# Patient Record
Sex: MALE | Race: BLACK OR AFRICAN AMERICAN | Employment: OTHER | URBAN - METROPOLITAN AREA
[De-identification: names, ages, dates, MRNs, and addresses within clinical notes are randomized per-mention and may not be internally consistent; named-entity substitution may affect disease eponyms.]

---

## 2018-02-01 ENCOUNTER — HOSPITAL ENCOUNTER (OUTPATIENT)
Dept: GENERAL RADIOLOGY | Age: 83
Discharge: HOME | End: 2018-02-01
Attending: NURSE PRACTITIONER
Payer: COMMERCIAL

## 2018-02-01 DIAGNOSIS — R10.13 EPIGASTRIC PAIN: ICD-10-CM

## 2018-02-01 DIAGNOSIS — R68.81 EARLY SATIETY: ICD-10-CM

## 2018-02-01 DIAGNOSIS — R10.12 LUQ ABDOMINAL PAIN: ICD-10-CM

## 2018-02-01 PROCEDURE — 74245 XR UGI/BA SWALLOW W SM BOWEL: CPT

## 2018-02-01 PROCEDURE — 74011000255 HC RX REV CODE- 255: Performed by: NURSE PRACTITIONER

## 2018-02-01 PROCEDURE — 74011000250 HC RX REV CODE- 250: Performed by: NURSE PRACTITIONER

## 2018-02-01 RX ADMIN — BARIUM SULFATE 355 ML: 0.6 SUSPENSION ORAL at 14:06

## 2018-02-01 RX ADMIN — ANTACID/ANTIFLATULENT 4 G: 380; 550; 10; 10 GRANULE, EFFERVESCENT ORAL at 14:07

## 2018-02-01 RX ADMIN — BARIUM SULFATE 135 ML: 980 POWDER, FOR SUSPENSION ORAL at 14:06

## 2018-02-01 RX ADMIN — BARIUM SULFATE 350 ML: 240 SUSPENSION ORAL at 14:20

## 2018-04-20 PROBLEM — K21.9 GASTROESOPHAGEAL REFLUX DISEASE WITHOUT ESOPHAGITIS: Status: ACTIVE | Noted: 2018-04-20

## 2019-02-28 ENCOUNTER — APPOINTMENT (OUTPATIENT)
Dept: GENERAL RADIOLOGY | Age: 84
End: 2019-02-28
Attending: EMERGENCY MEDICINE
Payer: COMMERCIAL

## 2019-02-28 ENCOUNTER — HOSPITAL ENCOUNTER (EMERGENCY)
Age: 84
Discharge: HOME | End: 2019-02-28
Attending: EMERGENCY MEDICINE
Payer: COMMERCIAL

## 2019-02-28 VITALS
BODY MASS INDEX: 23.49 KG/M2 | SYSTOLIC BLOOD PRESSURE: 164 MMHG | RESPIRATION RATE: 16 BRPM | TEMPERATURE: 100.1 F | OXYGEN SATURATION: 100 % | WEIGHT: 155 LBS | HEIGHT: 68 IN | HEART RATE: 58 BPM | DIASTOLIC BLOOD PRESSURE: 82 MMHG

## 2019-02-28 DIAGNOSIS — J18.9 PNEUMONIA OF LEFT LOWER LOBE DUE TO INFECTIOUS ORGANISM: Primary | ICD-10-CM

## 2019-02-28 LAB
ALBUMIN SERPL-MCNC: 2.8 G/DL (ref 3.2–4.6)
ALBUMIN/GLOB SERPL: 0.6 {RATIO} (ref 1.2–3.5)
ALP SERPL-CCNC: 102 U/L (ref 50–136)
ALT SERPL-CCNC: 23 U/L (ref 12–65)
ANION GAP SERPL CALC-SCNC: 7 MMOL/L (ref 7–16)
AST SERPL-CCNC: 26 U/L (ref 15–37)
BASOPHILS # BLD: 0 K/UL (ref 0–0.2)
BASOPHILS NFR BLD: 0 % (ref 0–2)
BILIRUB SERPL-MCNC: 0.3 MG/DL (ref 0.2–1.1)
BUN SERPL-MCNC: 35 MG/DL (ref 8–23)
CALCIUM SERPL-MCNC: 8.9 MG/DL (ref 8.3–10.4)
CHLORIDE SERPL-SCNC: 107 MMOL/L (ref 98–107)
CK SERPL-CCNC: 141 U/L (ref 21–215)
CO2 SERPL-SCNC: 27 MMOL/L (ref 21–32)
CREAT SERPL-MCNC: 2.11 MG/DL (ref 0.8–1.5)
DIFFERENTIAL METHOD BLD: ABNORMAL
EOSINOPHIL # BLD: 0.2 K/UL (ref 0–0.8)
EOSINOPHIL NFR BLD: 2 % (ref 0.5–7.8)
ERYTHROCYTE [DISTWIDTH] IN BLOOD BY AUTOMATED COUNT: 14.8 % (ref 11.9–14.6)
FLUAV AG NPH QL IA: NEGATIVE
FLUBV AG NPH QL IA: NEGATIVE
GLOBULIN SER CALC-MCNC: 4.5 G/DL (ref 2.3–3.5)
GLUCOSE SERPL-MCNC: 99 MG/DL (ref 65–100)
HCT VFR BLD AUTO: 34.7 % (ref 41.1–50.3)
HGB BLD-MCNC: 11.3 G/DL (ref 13.6–17.2)
IMM GRANULOCYTES # BLD AUTO: 0 K/UL (ref 0–0.5)
IMM GRANULOCYTES NFR BLD AUTO: 0 % (ref 0–5)
LIPASE SERPL-CCNC: 105 U/L (ref 73–393)
LYMPHOCYTES # BLD: 0.8 K/UL (ref 0.5–4.6)
LYMPHOCYTES NFR BLD: 8 % (ref 13–44)
MCH RBC QN AUTO: 27.2 PG (ref 26.1–32.9)
MCHC RBC AUTO-ENTMCNC: 32.6 G/DL (ref 31.4–35)
MCV RBC AUTO: 83.4 FL (ref 79.6–97.8)
MONOCYTES # BLD: 1.2 K/UL (ref 0.1–1.3)
MONOCYTES NFR BLD: 12 % (ref 4–12)
NEUTS SEG # BLD: 7.5 K/UL (ref 1.7–8.2)
NEUTS SEG NFR BLD: 77 % (ref 43–78)
NRBC # BLD: 0 K/UL (ref 0–0.2)
PLATELET # BLD AUTO: 358 K/UL (ref 150–450)
PMV BLD AUTO: 10.1 FL (ref 9.4–12.3)
POTASSIUM SERPL-SCNC: 3.4 MMOL/L (ref 3.5–5.1)
PROT SERPL-MCNC: 7.3 G/DL (ref 6.3–8.2)
RBC # BLD AUTO: 4.16 M/UL (ref 4.23–5.6)
SODIUM SERPL-SCNC: 141 MMOL/L (ref 136–145)
SPECIMEN SOURCE: NORMAL
WBC # BLD AUTO: 9.7 K/UL (ref 4.3–11.1)

## 2019-02-28 PROCEDURE — 74011250636 HC RX REV CODE- 250/636: Performed by: EMERGENCY MEDICINE

## 2019-02-28 PROCEDURE — 96365 THER/PROPH/DIAG IV INF INIT: CPT | Performed by: EMERGENCY MEDICINE

## 2019-02-28 PROCEDURE — 87804 INFLUENZA ASSAY W/OPTIC: CPT

## 2019-02-28 PROCEDURE — 74011250637 HC RX REV CODE- 250/637: Performed by: EMERGENCY MEDICINE

## 2019-02-28 PROCEDURE — 71045 X-RAY EXAM CHEST 1 VIEW: CPT

## 2019-02-28 PROCEDURE — 82550 ASSAY OF CK (CPK): CPT

## 2019-02-28 PROCEDURE — 83690 ASSAY OF LIPASE: CPT

## 2019-02-28 PROCEDURE — 80053 COMPREHEN METABOLIC PANEL: CPT

## 2019-02-28 PROCEDURE — 85025 COMPLETE CBC W/AUTO DIFF WBC: CPT

## 2019-02-28 PROCEDURE — 99284 EMERGENCY DEPT VISIT MOD MDM: CPT | Performed by: EMERGENCY MEDICINE

## 2019-02-28 PROCEDURE — 96375 TX/PRO/DX INJ NEW DRUG ADDON: CPT | Performed by: EMERGENCY MEDICINE

## 2019-02-28 RX ORDER — ACETAMINOPHEN 500 MG
1000 TABLET ORAL
Status: COMPLETED | OUTPATIENT
Start: 2019-02-28 | End: 2019-02-28

## 2019-02-28 RX ORDER — LEVOFLOXACIN 750 MG/1
750 TABLET ORAL DAILY
Qty: 9 TAB | Refills: 0 | Status: SHIPPED | OUTPATIENT
Start: 2019-02-28 | End: 2019-03-15

## 2019-02-28 RX ORDER — LEVOFLOXACIN 5 MG/ML
750 INJECTION, SOLUTION INTRAVENOUS
Status: COMPLETED | OUTPATIENT
Start: 2019-02-28 | End: 2019-02-28

## 2019-02-28 RX ORDER — PREDNISONE 20 MG/1
60 TABLET ORAL DAILY
Qty: 15 TAB | Refills: 0 | Status: SHIPPED | OUTPATIENT
Start: 2019-02-28 | End: 2019-03-05

## 2019-02-28 RX ORDER — TRAMADOL HYDROCHLORIDE 50 MG/1
50 TABLET ORAL
Status: COMPLETED | OUTPATIENT
Start: 2019-02-28 | End: 2019-02-28

## 2019-02-28 RX ADMIN — TRAMADOL HYDROCHLORIDE 50 MG: 50 TABLET, FILM COATED ORAL at 15:38

## 2019-02-28 RX ADMIN — METHYLPREDNISOLONE SODIUM SUCCINATE 125 MG: 125 INJECTION, POWDER, FOR SOLUTION INTRAMUSCULAR; INTRAVENOUS at 16:11

## 2019-02-28 RX ADMIN — ACETAMINOPHEN 1000 MG: 500 TABLET ORAL at 14:12

## 2019-02-28 RX ADMIN — LEVOFLOXACIN 750 MG: 5 INJECTION, SOLUTION INTRAVENOUS at 16:11

## 2019-02-28 RX ADMIN — SODIUM CHLORIDE 1000 ML: 900 INJECTION, SOLUTION INTRAVENOUS at 14:12

## 2019-02-28 NOTE — DISCHARGE INSTRUCTIONS
Because chest x-ray findings can sometimes lag with regards to clinical symptoms I'm going to empirically treat U for pneumonia. A fever, productive cough, and pleuritic chest pain on the left side. Complete the full course of steroids and antibiotics. Please return if her symptoms change or worsen.

## 2019-02-28 NOTE — ED PROVIDER NOTES
75-year-old male presenting for diffuse muscle aches and pains as well as some nausea. States his symptoms started several days ago, progressively worsening. He has not really tried anything for him. He set up a follow-up appointment with his PCP for the symptoms \"became too bad and he could not wait\". He describes muscle spasms in his neck, middle of his back down into his low back. He's had no cough or chest pain. He does report that he had a low-grade fever. Shortness of breath or sore throat. The history is provided by the patient. Abdominal Pain This is a new problem. The current episode started 2 days ago. The problem occurs constantly. The problem has not changed since onset. The pain is associated with an unknown factor. The quality of the pain is aching. The pain is at a severity of 2/10. The pain is mild. Associated symptoms include a fever, headaches, arthralgias, myalgias and back pain. Pertinent negatives include no anorexia, no belching, no diarrhea, no flatus, no hematochezia, no melena, no nausea, no vomiting, no constipation, no dysuria, no frequency, no hematuria, no trauma, no chest pain and no testicular pain. The pain is worsened by activity. The pain is relieved by nothing. His past medical history does not include PUD, gallstones, GERD, ulcerative colitis, Crohn's disease, irritable bowel syndrome, cancer, UTI, pancreatitis, ectopic pregnancy, ovarian cysts, diverticulitis, atrial fibrillation, DM, kidney stones or small bowel obstruction. The patient's surgical history non-contributory. Past Medical History:  
Diagnosis Date  Asthma 2007  
 last exacerbation 5 yrs ago; very well controlled.  BPH (benign prostatic hypertrophy)  Cancer ([facility] [address] stomach(2004);  prostate cancer (2008)  Chronic kidney disease 2004  
 followed by [facility] (Dr. Sana Jalloh)  [facility] 7/28/2011  GERD (gastroesophageal reflux disease)   
 controlled w/pepcid  Gout  Hemorrhoids, internal   
 History of diverticulosis  Hypercholesteremia  Hypertension   
 controlled w/med. Last echo (9/14/10):EF 55-60%, normal LVSF. Stress test (9/14/10): EF 56%,mild inferoseptal scar.  Muscle spasm  OA (osteoarthritis)  REZA (obstructive sleep apnea) 7/28/2011  
 no cpap  Osteoporosis  Personal history of colonic polyps  Prediabetes  Wears dentures Past Surgical History:  
Procedure Laterality Date St BIOPSY PROSTATE  2008  
 treated w/injections every 6months by Dr. Geraldo Hsu  HX GASTRECTOMY  2004 RESECTION FOR CANCER  
 HX KNEE REPLACEMENT Bilateral   
 HX PROSTATECTOMY  2002 SUPRAPUBIC  
 SINUS SURGERY PROC UNLISTED  2005 MUCORMYCOSIS Family History:  
Problem Relation Age of Onset  Hypertension Mother  No Known Problems Father   
     pt does not know him  Hypertension Sister Social History Socioeconomic History  Marital status: LEGALLY  Spouse name: Not on file  Number of children: Not on file  Years of education: Not on file  Highest education level: Not on file Social Needs  Financial resource strain: Not on file  Food insecurity - worry: Not on file  Food insecurity - inability: Not on file  Transportation needs - medical: Not on file  Transportation needs - non-medical: Not on file Occupational History  Not on file Tobacco Use  Smoking status: Never Smoker  Smokeless tobacco: Never Used  Tobacco comment: cigars Substance and Sexual Activity  Alcohol use: No  
 Drug use: No  
 Sexual activity: Not Currently Other Topics Concern  Not on file Social History Narrative  Not on file ALLERGIES: Patient has no known allergies. Review of Systems Constitutional: Positive for fever. Cardiovascular: Negative for chest pain. Gastrointestinal: Positive for abdominal pain. Negative for anorexia, constipation, diarrhea, flatus, hematochezia, melena, nausea and vomiting. Genitourinary: Negative for dysuria, frequency, hematuria and testicular pain. Musculoskeletal: Positive for arthralgias, back pain and myalgias. Neurological: Positive for headaches. All other systems reviewed and are negative. Vitals:  
 02/28/19 1312 BP: 167/80 Pulse: 65 Resp: 16 Temp: (!) 100.9 °F (38.3 °C) SpO2: 100% Weight: 70.3 kg (155 lb) Height: 5' 8\" (1.727 m) Physical Exam  
Constitutional: He is oriented to person, place, and time. He appears well-developed and well-nourished. HENT:  
Head: Normocephalic and atraumatic. Eyes: Conjunctivae and EOM are normal. Pupils are equal, round, and reactive to light. Neck: Normal range of motion. Neck supple. Cardiovascular: Normal rate, regular rhythm, normal heart sounds and intact distal pulses. Pulmonary/Chest: Effort normal and breath sounds normal.  
Abdominal: Soft. Bowel sounds are normal.  
Musculoskeletal: Normal range of motion. He exhibits no deformity. Tenderness to palpation along the paraspinous muscles of the cervical, thoracic, and lumbar spine, no midline tenderness Neurological: He is alert and oriented to person, place, and time. No cranial nerve deficit. Skin: Skin is warm and dry. Psychiatric: He has a normal mood and affect. His behavior is normal.  
Nursing note and vitals reviewed. MDM Number of Diagnoses or Management Options Pneumonia of left lower lobe due to infectious organism [facility]):  
Diagnosis management comments: 51-year-old male with a fever and diffuse muscle aches. This is typical influenza type illness. Vital signs are reassuring. We will get basic labs with a CK, chest x-ray and influenza screen Amount and/or Complexity of Data Reviewed Clinical lab tests: ordered and reviewed Tests in the radiology section of CPT®: ordered and reviewed Tests in the medicine section of CPT®: ordered Decide to obtain previous medical records or to obtain history from someone other than the patient: yes Independent visualization of images, tracings, or specimens: yes (Personally reviewed the patient's chest x-ray) Risk of Complications, Morbidity, and/or Mortality Presenting problems: moderate Diagnostic procedures: moderate Management options: moderate General comments: I personally reviewed the patient's vital signs, laboratory tests, and/or radiological findings. I discussed these findings with the patient and there significance. I answered all questions and gave the patient clear return precautions. The patient was discharged from the emergency department in stable condition Patient Progress Patient progress: improved ED Course as of Feb 28 1614 Thu Feb 28, 2019  
1519 I reviewed the patient's CBC which is unremarkable, his creatinine is at his baseline, his potassium is borderline low but not very concerning. Lipase is normal, CK is normal, his influenza screen is negative. At this point, it is unclear exactly what is causing the patient's symptoms. His abdomen is benign he has no urinary symptoms. [JS] 1536 Patient reports now that he's had ongoing joint issues and he has been trying to get in with his primary doctor to get a steroid injection. The patient Solu-Medrol, tramadol and reevaluate. [JS] [id number] After talking further with the patient underwent a cover him empirically with antibiotics and steroids. [JS] ED Course User Index [JS] Tanisha Conteh MD  
 
 
Procedures

## 2019-02-28 NOTE — ED NOTES
I have reviewed discharge instructions with the patient. The patient verbalized understanding. Patient left ED via Discharge Method: wheelchair to waiting room to await transportation. Opportunity for questions and clarification provided. Patient given 2 scripts. No e-sign To continue your aftercare when you leave the hospital, you may receive an automated call from our care team to check in on how you are doing. This is a free service and part of our promise to provide the best care and service to meet your aftercare needs.  If you have questions, or wish to unsubscribe from this service please call [phone]. Thank you for Choosing our [facility] Emergency Department.

## 2019-02-28 NOTE — ED NOTES
Lab contacted, spoke with Allison Bucio, aware bloodwork sent with chart labels as clinical connect will not load pt stickers.

## 2019-02-28 NOTE — ED TRIAGE NOTES
Pt arrives via [facility] from home, pt states generalized body aches and abdominal pain, R bundle with EMS. VSS.

## 2019-03-23 ENCOUNTER — HOSPITAL ENCOUNTER (EMERGENCY)
Age: 84
Discharge: HOME | End: 2019-03-23
Attending: EMERGENCY MEDICINE
Payer: COMMERCIAL

## 2019-03-23 ENCOUNTER — APPOINTMENT (OUTPATIENT)
Dept: GENERAL RADIOLOGY | Age: 84
End: 2019-03-23
Attending: NURSE PRACTITIONER
Payer: COMMERCIAL

## 2019-03-23 VITALS
RESPIRATION RATE: 18 BRPM | HEIGHT: 68 IN | HEART RATE: 95 BPM | WEIGHT: 155 LBS | DIASTOLIC BLOOD PRESSURE: 93 MMHG | TEMPERATURE: 98.8 F | OXYGEN SATURATION: 98 % | SYSTOLIC BLOOD PRESSURE: 156 MMHG | BODY MASS INDEX: 23.49 KG/M2

## 2019-03-23 DIAGNOSIS — M54.42 ACUTE MIDLINE LOW BACK PAIN WITH LEFT-SIDED SCIATICA: Primary | ICD-10-CM

## 2019-03-23 LAB
ANION GAP SERPL CALC-SCNC: 7 MMOL/L (ref 7–16)
BASOPHILS # BLD: 0 K/UL (ref 0–0.2)
BASOPHILS NFR BLD: 1 % (ref 0–2)
BUN SERPL-MCNC: 32 MG/DL (ref 8–23)
CALCIUM SERPL-MCNC: 8.9 MG/DL (ref 8.3–10.4)
CHLORIDE SERPL-SCNC: 109 MMOL/L (ref 98–107)
CO2 SERPL-SCNC: 27 MMOL/L (ref 21–32)
CREAT SERPL-MCNC: 1.94 MG/DL (ref 0.8–1.5)
DIFFERENTIAL METHOD BLD: ABNORMAL
EOSINOPHIL # BLD: 0.3 K/UL (ref 0–0.8)
EOSINOPHIL NFR BLD: 5 % (ref 0.5–7.8)
ERYTHROCYTE [DISTWIDTH] IN BLOOD BY AUTOMATED COUNT: 16.2 % (ref 11.9–14.6)
GLUCOSE SERPL-MCNC: 76 MG/DL (ref 65–100)
HCT VFR BLD AUTO: 35.2 % (ref 41.1–50.3)
HGB BLD-MCNC: 11.4 G/DL (ref 13.6–17.2)
IMM GRANULOCYTES # BLD AUTO: 0 K/UL (ref 0–0.5)
IMM GRANULOCYTES NFR BLD AUTO: 0 % (ref 0–5)
LYMPHOCYTES # BLD: 1 K/UL (ref 0.5–4.6)
LYMPHOCYTES NFR BLD: 19 % (ref 13–44)
MCH RBC QN AUTO: 27.3 PG (ref 26.1–32.9)
MCHC RBC AUTO-ENTMCNC: 32.4 G/DL (ref 31.4–35)
MCV RBC AUTO: 84.4 FL (ref 79.6–97.8)
MONOCYTES # BLD: 0.8 K/UL (ref 0.1–1.3)
MONOCYTES NFR BLD: 15 % (ref 4–12)
NEUTS SEG # BLD: 3.3 K/UL (ref 1.7–8.2)
NEUTS SEG NFR BLD: 61 % (ref 43–78)
NRBC # BLD: 0 K/UL (ref 0–0.2)
PLATELET # BLD AUTO: 298 K/UL (ref 150–450)
PMV BLD AUTO: 10.4 FL (ref 9.4–12.3)
POTASSIUM SERPL-SCNC: 4.2 MMOL/L (ref 3.5–5.1)
RBC # BLD AUTO: 4.17 M/UL (ref 4.23–5.6)
SODIUM SERPL-SCNC: 143 MMOL/L (ref 136–145)
WBC # BLD AUTO: 5.4 K/UL (ref 4.3–11.1)

## 2019-03-23 PROCEDURE — 80048 BASIC METABOLIC PNL TOTAL CA: CPT

## 2019-03-23 PROCEDURE — 85025 COMPLETE CBC W/AUTO DIFF WBC: CPT

## 2019-03-23 PROCEDURE — 99283 EMERGENCY DEPT VISIT LOW MDM: CPT | Performed by: NURSE PRACTITIONER

## 2019-03-23 PROCEDURE — 74011000250 HC RX REV CODE- 250: Performed by: NURSE PRACTITIONER

## 2019-03-23 PROCEDURE — 72100 X-RAY EXAM L-S SPINE 2/3 VWS: CPT

## 2019-03-23 PROCEDURE — 81003 URINALYSIS AUTO W/O SCOPE: CPT | Performed by: NURSE PRACTITIONER

## 2019-03-23 RX ORDER — LIDOCAINE 50 MG/G
PATCH TOPICAL
Qty: 12 EACH | Refills: 0 | Status: SHIPPED | OUTPATIENT
Start: 2019-03-23 | End: 2019-08-23 | Stop reason: ALTCHOICE

## 2019-03-23 RX ORDER — PREDNISONE 10 MG/1
10 TABLET ORAL DAILY
Qty: 5 TAB | Refills: 0 | Status: SHIPPED | OUTPATIENT
Start: 2019-03-23 | End: 2019-03-27 | Stop reason: ALTCHOICE

## 2019-03-23 RX ORDER — LIDOCAINE 4 G/100G
1 PATCH TOPICAL
Status: DISCONTINUED | OUTPATIENT
Start: 2019-03-23 | End: 2019-03-23 | Stop reason: HOSPADM

## 2019-03-23 NOTE — ED NOTES
I have reviewed discharge instructions with the patient. The patient verbalized understanding. Patient left ED via Discharge Method: ambulatory to Home with self. Opportunity for questions and clarification provided. Patient given 2 scripts. To continue your aftercare when you leave the hospital, you may receive an automated call from our care team to check in on how you are doing. This is a free service and part of our promise to provide the best care and service to meet your aftercare needs.  If you have questions, or wish to unsubscribe from this service please call [phone]. Thank you for Choosing our [facility] Emergency Department.

## 2019-03-23 NOTE — ED TRIAGE NOTES
Patient arrives stating lower back pain. States difficulty with ROM and walking. Denies pain radiating to legs. States he feels it's a nerve issue. Normally does not use walker for assistance but has one today. Denies trauma to back. Denies change in bowel habits. Denies numbness/weakness

## 2019-03-23 NOTE — ED PROVIDER NOTES
60-year-old male presents for evaluation of mid back pain onset approximately a week ago. He reports that approximately 2 weeks ago he was seen and treated for pneumonia. Approximately 10 days ago he began having lower extremity edema. In the last week he began having low back pain that does not radiate. He also began having urgency of urine and bowel in the last week, and reports that if he does not go on the first notion that he needs to go he will not make it to the bathroom. He denies numbness or tingling. However he has had to  his walker for stability due to the pain in his low back. He does not normally use a walker, he works as a  at night, and is active in his own home doing things such as gardening. He denies a preceding event other than walking a lot. Past Medical History:  
Diagnosis Date  Asthma 2007  
 last exacerbation 5 yrs ago; very well controlled.  BPH (benign prostatic hypertrophy)  Cancer ([facility] [address] stomach(2004);  prostate cancer (2008)  Chronic kidney disease 2004  
 followed by [facility] (Dr. Mojica Smoker)  COPD (chronic obstructive pulmonary disease) ([facility] [address] 7/28/2011  GERD (gastroesophageal reflux disease)   
 controlled w/pepcid  Gout  Hemorrhoids, internal   
 History of diverticulosis  Hypercholesteremia  Hypertension   
 controlled w/med. Last echo (9/14/10):EF 55-60%, normal LVSF. Stress test (9/14/10): EF 56%,mild inferoseptal scar.  Muscle spasm  OA (osteoarthritis)  REZA (obstructive sleep apnea) 7/28/2011  
 no cpap  Osteoporosis  Personal history of colonic polyps  Prediabetes  Wears dentures Past Surgical History:  
Procedure Laterality Date St BIOPSY PROSTATE  2008  
 treated w/injections every 6months by Dr. Williams Billings  HX GASTRECTOMY  2004 RESECTION FOR CANCER  
 HX KNEE REPLACEMENT Bilateral   
 HX PROSTATECTOMY  2002  SUPRAPUBIC  
 [address] [facility] UNLISTED  2005 MUCORMYCOSIS Family History:  
Problem Relation Age of Onset  Hypertension Mother  No Known Problems Father   
     pt does not know him  Hypertension Sister Social History Socioeconomic History  Marital status: LEGALLY  Spouse name: Not on file  Number of children: Not on file  Years of education: Not on file  Highest education level: Not on file Occupational History  Not on file Social Needs  Financial resource strain: Not on file  Food insecurity:  
  Worry: Not on file Inability: Not on file  Transportation needs:  
  Medical: Not on file Non-medical: Not on file Tobacco Use  Smoking status: Never Smoker  Smokeless tobacco: Never Used  Tobacco comment: cigars Substance and Sexual Activity  Alcohol use: No  
 Drug use: No  
 Sexual activity: Not Currently Lifestyle  Physical activity:  
  Days per week: Not on file Minutes per session: Not on file  Stress: Not on file Relationships  Social connections:  
  Talks on phone: Not on file Gets together: Not on file Attends Yarsanism service: Not on file Active member of club or organization: Not on file Attends meetings of clubs or organizations: Not on file Relationship status: Not on file  Intimate partner violence:  
  Fear of current or ex partner: Not on file Emotionally abused: Not on file Physically abused: Not on file Forced sexual activity: Not on file Other Topics Concern  Not on file Social History Narrative  Not on file ALLERGIES: Patient has no known allergies. Review of Systems Constitutional: Negative for chills and fever. HENT: Negative for mouth sores and rhinorrhea. Eyes: Negative for discharge and redness. Respiratory: Negative for cough and shortness of breath. Cardiovascular: Positive for leg swelling.  Negative for chest pain and palpitations. Gastrointestinal: Negative for nausea and vomiting. Genitourinary: Positive for urgency. Negative for dysuria. Musculoskeletal: Positive for back pain, gait problem and myalgias. Skin: Negative for color change and rash. Neurological: Negative for weakness and numbness. Psychiatric/Behavioral: Negative for confusion and decreased concentration. Vitals:  
 03/23/19 1219 BP: (!) 156/93 Pulse: 95 Resp: 18 Temp: 98.8 °F (37.1 °C) SpO2: 98% Weight: 70.3 kg (155 lb) Height: 5' 8\" (1.727 m) Physical Exam  
Constitutional: He is oriented to person, place, and time. He appears well-developed and well-nourished. HENT:  
Head: Normocephalic and atraumatic. Eyes: Pupils are equal, round, and reactive to light. EOM are normal.  
Neck: Normal range of motion. Neck supple. Cardiovascular: Normal rate, normal heart sounds and intact distal pulses. Pulmonary/Chest: Effort normal and breath sounds normal. He has no wheezes. He has no rales. Abdominal: Soft. Musculoskeletal: He exhibits tenderness. He exhibits no edema. Legs: 
Neurological: He is alert and oriented to person, place, and time. Skin: Skin is warm and dry. He is not diaphoretic. No erythema. Psychiatric: He has a normal mood and affect. His behavior is normal. Judgment and thought content normal.  
Nursing note and vitals reviewed. MDM Number of Diagnoses or Management Options Diagnosis management comments: 59-year-old male presents for evaluation of mid back pain onset approximately a week ago. He reports that approximately 2 weeks ago he was seen and treated for pneumonia. Approximately 10 days ago he began having lower extremity edema. In the last week he began having low back pain that does not radiate.   He also began having urgency of urine and bowel in the last week, and reports that if he does not go on the first notion that he needs to go he will not make it to the bathroom. He denies numbness or tingling. However he has had to  his walker for stability due to the pain in his low back. He does not normally use a walker, he works as a  at night, and is active in his own home doing things such as gardening. He denies a preceding event other than walking a lot. Patient's presentation is consistent with sciatica. I palpated his left calf and he had moderate tenderness however he said the tenderness shot up toward his buttock and into the area on his back that was having tenderness. There is no erythema present to the left calf. He does have approximately 2-3+ edema bilateral lower extremities however. Urine dip has some protein however there is no leukocyte and he is nitrite negative. He is ambulatory with the assistance of his walker. Straight leg lifts are as noted. Will evaluate baseline labs to evaluate his chronic renal insufficiency as well evaluate lumbar spine film. Will provide pain patch in the interim and reevaluate shortly. 2:34 PM  Chronic findings on x-ray. Diagnostics lab show chronic renal insufficiency similar to his previous. He states he is feeling 100% better than on his arrival after the Lidoderm patch. I have instructed him to remove his wallet from his left back pocket and switch it from side to side as this may ease his sciatic pain in the future. Will discharge him home with Lidoderm patches as well with low-dose steroid and he is to follow with his family doctor this coming week Amount and/or Complexity of Data Reviewed Clinical lab tests: ordered and reviewed Tests in the radiology section of CPT®: ordered and reviewed Risk of Complications, Morbidity, and/or Mortality Presenting problems: minimal 
Diagnostic procedures: minimal 
Management options: minimal 
 
Patient Progress Patient progress: stable Procedures

## 2019-03-23 NOTE — DISCHARGE INSTRUCTIONS
Patient Education   Patient Education        Sciatica: Exercises  Your Care Instructions  Here are some examples of typical rehabilitation exercises for your condition. Start each exercise slowly. Ease off the exercise if you start to have pain. Your doctor or physical therapist will tell you when you can start these exercises and which ones will work best for you. When you are not being active, find a comfortable position for rest. Some people are comfortable on the floor or a medium-firm bed with a small pillow under their head and another under their knees. Some people prefer to lie on their side with a pillow between their knees. Don't stay in one position for too long. Take short walks (10 to 20 minutes) every 2 to 3 hours. Avoid slopes, hills, and stairs until you feel better. Walk only distances you can manage without pain, especially leg pain. How to do the exercises  Back stretches    1. Get down on your hands and knees on the floor. 2. Relax your head and allow it to droop. Round your back up toward the ceiling until you feel a nice stretch in your upper, middle, and lower back. Hold this stretch for as long as it feels comfortable, or about 15 to 30 seconds. 3. Return to the starting position with a flat back while you are on your hands and knees. 4. Let your back sway by pressing your stomach toward the floor. Lift your buttocks toward the ceiling. 5. Hold this position for 15 to 30 seconds. 6. Repeat 2 to 4 times. Follow-up care is a key part of your treatment and safety. Be sure to make and go to all appointments, and call your doctor if you are having problems. It's also a good idea to know your test results and keep a list of the medicines you take. Where can you learn more? Go to http://lio-trudy.info/. Enter Z604 in the search box to learn more about \"Sciatica: Exercises. \"  Current as of: September 20, 2018  Content Version: 11.9  © 7791-6055 [employer]. Care instructions adapted under license by [employer] (which disclaims liability or warranty for this information). If you have questions about a medical condition or this instruction, always ask your healthcare professional. [address] any warranty or liability for your use of this information. Sciatica: Care Instructions  Your Care Instructions    Sciatica (say \"kyh-YL-dd-kuh\") is an irritation of one of the sciatic nerves, which come from the spinal cord in the lower back. The sciatic nerves and their branches extend down through the buttock to the foot. Sciatica can develop when an injured disc in the back presses against a spinal nerve root. Its main symptom is pain, numbness, or weakness that is often worse in the leg or foot than in the back. Sciatica often will improve and go away with time. Early treatment usually includes medicines and exercises to relieve pain. Follow-up care is a key part of your treatment and safety. Be sure to make and go to all appointments, and call your doctor if you are having problems. It's also a good idea to know your test results and keep a list of the medicines you take. How can you care for yourself at home? · Take pain medicines exactly as directed. ? If the doctor gave you a prescription medicine for pain, take it as prescribed. ? If you are not taking a prescription pain medicine, ask your doctor if you can take an over-the-counter medicine. · Use heat or ice to relieve pain. ? To apply heat, put a warm water bottle, heating pad set on low, or warm cloth on your back. Do not go to sleep with a heating pad on your skin. ? To use ice, put ice or a cold pack on the area for 10 to 20 minutes at a time. Put a thin cloth between the ice and your skin. · Avoid sitting if possible, unless it feels better than standing. · Alternate lying down with short walks.  Increase your walking distance as you are able to without making your symptoms worse. · Do not do anything that makes your symptoms worse. When should you call for help? Call 911 anytime you think you may need emergency care. For example, call if:    · You are unable to move a leg at all.   [facility] your doctor now or seek immediate medical care if:    · You have new or worse symptoms in your legs or buttocks. Symptoms may include:  ? Numbness or tingling. ? Weakness. ? Pain.     · You lose bladder or bowel control.    Watch closely for changes in your health, and be sure to contact your doctor if:    · You are not getting better as expected. Where can you learn more? Go to http://lio-trudy.info/. Enter [phone] in the search box to learn more about \"Sciatica: Care Instructions. \"  Current as of: September 20, 2018  Content Version: 11.9  © 1896-8999 [employer]. Care instructions adapted under license by [employer] (which disclaims liability or warranty for this information). If you have questions about a medical condition or this instruction, always ask your healthcare professional. [address] any warranty or liability for your use of this information. home with family    Take medications as directed. Apply patch once daily. Follow-up with your family doctor this coming week.

## 2019-08-23 PROBLEM — N18.30 CKD (CHRONIC KIDNEY DISEASE) STAGE 3, GFR 30-59 ML/MIN (HCC): Status: ACTIVE | Noted: 2019-08-23

## 2020-06-14 ENCOUNTER — APPOINTMENT (OUTPATIENT)
Dept: GENERAL RADIOLOGY | Age: 85
DRG: 871 | End: 2020-06-14
Attending: EMERGENCY MEDICINE
Payer: COMMERCIAL

## 2020-06-14 ENCOUNTER — HOSPITAL ENCOUNTER (INPATIENT)
Age: 85
LOS: 4 days | Discharge: HOME | DRG: 871 | End: 2020-06-18
Attending: EMERGENCY MEDICINE | Admitting: HOSPITALIST
Payer: COMMERCIAL

## 2020-06-14 DIAGNOSIS — Z20.822 SUSPECTED COVID-19 VIRUS INFECTION: ICD-10-CM

## 2020-06-14 DIAGNOSIS — R50.9 ACUTE FEBRILE ILLNESS: Primary | ICD-10-CM

## 2020-06-14 DIAGNOSIS — N18.9 CHRONIC KIDNEY DISEASE, UNSPECIFIED CKD STAGE: ICD-10-CM

## 2020-06-14 DIAGNOSIS — E87.20 LACTIC ACIDOSIS: ICD-10-CM

## 2020-06-14 PROBLEM — J96.01 ACUTE HYPOXEMIC RESPIRATORY FAILURE (HCC): Status: ACTIVE | Noted: 2020-06-14

## 2020-06-14 PROBLEM — A41.9 SEPSIS (HCC): Status: ACTIVE | Noted: 2020-06-14

## 2020-06-14 LAB
ALBUMIN SERPL-MCNC: 3 G/DL (ref 3.2–4.6)
ALBUMIN/GLOB SERPL: 0.6 {RATIO} (ref 1.2–3.5)
ALP SERPL-CCNC: 153 U/L (ref 50–136)
ALT SERPL-CCNC: 27 U/L (ref 12–65)
ANION GAP SERPL CALC-SCNC: 10 MMOL/L (ref 7–16)
APPEARANCE UR: CLEAR
AST SERPL-CCNC: 63 U/L (ref 15–37)
BACTERIA URNS QL MICRO: 0 /HPF
BASOPHILS # BLD: 0 K/UL (ref 0–0.2)
BASOPHILS NFR BLD: 1 % (ref 0–2)
BILIRUB SERPL-MCNC: 0.6 MG/DL (ref 0.2–1.1)
BILIRUB UR QL: NEGATIVE
BNP SERPL-MCNC: 1143 PG/ML
BUN SERPL-MCNC: 26 MG/DL (ref 8–23)
CALCIUM SERPL-MCNC: 8.8 MG/DL (ref 8.3–10.4)
CHLORIDE SERPL-SCNC: 109 MMOL/L (ref 98–107)
CO2 SERPL-SCNC: 21 MMOL/L (ref 21–32)
COLOR UR: YELLOW
CREAT SERPL-MCNC: 2.05 MG/DL (ref 0.8–1.5)
CRP SERPL-MCNC: 0.9 MG/DL (ref 0–0.9)
D DIMER PPP FEU-MCNC: 1.85 UG/ML(FEU)
DIFFERENTIAL METHOD BLD: ABNORMAL
EOSINOPHIL # BLD: 0.4 K/UL (ref 0–0.8)
EOSINOPHIL NFR BLD: 5 % (ref 0.5–7.8)
ERYTHROCYTE [DISTWIDTH] IN BLOOD BY AUTOMATED COUNT: 15.1 % (ref 11.9–14.6)
FERRITIN SERPL-MCNC: 55 NG/ML (ref 8–388)
GLOBULIN SER CALC-MCNC: 5 G/DL (ref 2.3–3.5)
GLUCOSE SERPL-MCNC: 100 MG/DL (ref 65–100)
GLUCOSE UR STRIP.AUTO-MCNC: NEGATIVE MG/DL
HCT VFR BLD AUTO: 45.9 % (ref 41.1–50.3)
HGB BLD-MCNC: 14.5 G/DL (ref 13.6–17.2)
HGB UR QL STRIP: ABNORMAL
IMM GRANULOCYTES # BLD AUTO: 0 K/UL (ref 0–0.5)
IMM GRANULOCYTES NFR BLD AUTO: 0 % (ref 0–5)
KETONES UR QL STRIP.AUTO: NEGATIVE MG/DL
LACTATE SERPL-SCNC: 2.7 MMOL/L (ref 0.4–2)
LACTATE SERPL-SCNC: 3.5 MMOL/L (ref 0.4–2)
LDH SERPL L TO P-CCNC: 241 U/L (ref 110–210)
LEUKOCYTE ESTERASE UR QL STRIP.AUTO: NEGATIVE
LIPASE SERPL-CCNC: 74 U/L (ref 73–393)
LYMPHOCYTES # BLD: 0.4 K/UL (ref 0.5–4.6)
LYMPHOCYTES NFR BLD: 5 % (ref 13–44)
MAGNESIUM SERPL-MCNC: 2 MG/DL (ref 1.8–2.4)
MCH RBC QN AUTO: 26.9 PG (ref 26.1–32.9)
MCHC RBC AUTO-ENTMCNC: 31.6 G/DL (ref 31.4–35)
MCV RBC AUTO: 85 FL (ref 79.6–97.8)
MONOCYTES # BLD: 0.1 K/UL (ref 0.1–1.3)
MONOCYTES NFR BLD: 1 % (ref 4–12)
NEUTS SEG # BLD: 6.4 K/UL (ref 1.7–8.2)
NEUTS SEG NFR BLD: 88 % (ref 43–78)
NITRITE UR QL STRIP.AUTO: NEGATIVE
NRBC # BLD: 0 K/UL (ref 0–0.2)
PH UR STRIP: 6 [PH] (ref 5–9)
PLATELET # BLD AUTO: 168 K/UL (ref 150–450)
PMV BLD AUTO: 10.7 FL (ref 9.4–12.3)
POTASSIUM SERPL-SCNC: 4.4 MMOL/L (ref 3.5–5.1)
PROCALCITONIN SERPL-MCNC: 0.22 NG/ML
PROT SERPL-MCNC: 8 G/DL (ref 6.3–8.2)
PROT UR STRIP-MCNC: NEGATIVE MG/DL
RBC # BLD AUTO: 5.4 M/UL (ref 4.23–5.6)
SODIUM SERPL-SCNC: 140 MMOL/L (ref 136–145)
SP GR UR REFRACTOMETRY: 1.01 (ref 1–1.02)
TROPONIN-HIGH SENSITIVITY: 9.8 PG/ML (ref 0–14)
UROBILINOGEN UR QL STRIP.AUTO: 1 EU/DL (ref 0.2–1)
WBC # BLD AUTO: 7.3 K/UL (ref 4.3–11.1)

## 2020-06-14 PROCEDURE — 81015 MICROSCOPIC EXAM OF URINE: CPT

## 2020-06-14 PROCEDURE — 74011000258 HC RX REV CODE- 258: Performed by: EMERGENCY MEDICINE

## 2020-06-14 PROCEDURE — 83880 ASSAY OF NATRIURETIC PEPTIDE: CPT

## 2020-06-14 PROCEDURE — 96366 THER/PROPH/DIAG IV INF ADDON: CPT

## 2020-06-14 PROCEDURE — 74011250636 HC RX REV CODE- 250/636

## 2020-06-14 PROCEDURE — 85025 COMPLETE CBC W/AUTO DIFF WBC: CPT

## 2020-06-14 PROCEDURE — 84484 ASSAY OF TROPONIN QUANT: CPT

## 2020-06-14 PROCEDURE — 96365 THER/PROPH/DIAG IV INF INIT: CPT

## 2020-06-14 PROCEDURE — 96375 TX/PRO/DX INJ NEW DRUG ADDON: CPT

## 2020-06-14 PROCEDURE — 83605 ASSAY OF LACTIC ACID: CPT

## 2020-06-14 PROCEDURE — 86140 C-REACTIVE PROTEIN: CPT

## 2020-06-14 PROCEDURE — 83690 ASSAY OF LIPASE: CPT

## 2020-06-14 PROCEDURE — 83615 LACTATE (LD) (LDH) ENZYME: CPT

## 2020-06-14 PROCEDURE — 87186 SC STD MICRODIL/AGAR DIL: CPT

## 2020-06-14 PROCEDURE — 85379 FIBRIN DEGRADATION QUANT: CPT

## 2020-06-14 PROCEDURE — 87150 DNA/RNA AMPLIFIED PROBE: CPT

## 2020-06-14 PROCEDURE — 74011250636 HC RX REV CODE- 250/636: Performed by: FAMILY MEDICINE

## 2020-06-14 PROCEDURE — 87040 BLOOD CULTURE FOR BACTERIA: CPT

## 2020-06-14 PROCEDURE — 94664 DEMO&/EVAL PT USE INHALER: CPT

## 2020-06-14 PROCEDURE — 99284 EMERGENCY DEPT VISIT MOD MDM: CPT

## 2020-06-14 PROCEDURE — 74011250637 HC RX REV CODE- 250/637: Performed by: EMERGENCY MEDICINE

## 2020-06-14 PROCEDURE — 87205 SMEAR GRAM STAIN: CPT

## 2020-06-14 PROCEDURE — 81003 URINALYSIS AUTO W/O SCOPE: CPT

## 2020-06-14 PROCEDURE — 94640 AIRWAY INHALATION TREATMENT: CPT

## 2020-06-14 PROCEDURE — 80053 COMPREHEN METABOLIC PANEL: CPT

## 2020-06-14 PROCEDURE — 82728 ASSAY OF FERRITIN: CPT

## 2020-06-14 PROCEDURE — 65270000029 HC RM PRIVATE

## 2020-06-14 PROCEDURE — 71045 X-RAY EXAM CHEST 1 VIEW: CPT

## 2020-06-14 PROCEDURE — 74011250636 HC RX REV CODE- 250/636: Performed by: EMERGENCY MEDICINE

## 2020-06-14 PROCEDURE — 74011000250 HC RX REV CODE- 250: Performed by: HOSPITALIST

## 2020-06-14 PROCEDURE — 87077 CULTURE AEROBIC IDENTIFY: CPT

## 2020-06-14 PROCEDURE — 84145 PROCALCITONIN (PCT): CPT

## 2020-06-14 PROCEDURE — 83735 ASSAY OF MAGNESIUM: CPT

## 2020-06-14 RX ORDER — MAGNESIUM SULFATE HEPTAHYDRATE 40 MG/ML
4 INJECTION, SOLUTION INTRAVENOUS ONCE
Status: COMPLETED | OUTPATIENT
Start: 2020-06-14 | End: 2020-06-14

## 2020-06-14 RX ORDER — HEPARIN SODIUM 5000 [USP'U]/ML
5000 INJECTION, SOLUTION INTRAVENOUS; SUBCUTANEOUS EVERY 8 HOURS
Status: DISCONTINUED | OUTPATIENT
Start: 2020-06-14 | End: 2020-06-15

## 2020-06-14 RX ORDER — HEPARIN SODIUM 5000 [USP'U]/ML
5000 INJECTION, SOLUTION INTRAVENOUS; SUBCUTANEOUS EVERY 8 HOURS
Status: DISCONTINUED | OUTPATIENT
Start: 2020-06-14 | End: 2020-06-14

## 2020-06-14 RX ORDER — ACETAMINOPHEN 650 MG/1
650 SUPPOSITORY RECTAL
Status: DISCONTINUED | OUTPATIENT
Start: 2020-06-14 | End: 2020-06-18 | Stop reason: HOSPADM

## 2020-06-14 RX ORDER — ENOXAPARIN SODIUM 100 MG/ML
40 INJECTION SUBCUTANEOUS EVERY 24 HOURS
Status: DISCONTINUED | OUTPATIENT
Start: 2020-06-14 | End: 2020-06-14

## 2020-06-14 RX ORDER — ALBUTEROL SULFATE 0.83 MG/ML
2.5 SOLUTION RESPIRATORY (INHALATION)
Status: DISCONTINUED | OUTPATIENT
Start: 2020-06-14 | End: 2020-06-18 | Stop reason: HOSPADM

## 2020-06-14 RX ORDER — SODIUM CHLORIDE 0.9 % (FLUSH) 0.9 %
5-40 SYRINGE (ML) INJECTION EVERY 8 HOURS
Status: DISCONTINUED | OUTPATIENT
Start: 2020-06-14 | End: 2020-06-18 | Stop reason: HOSPADM

## 2020-06-14 RX ORDER — ACETAMINOPHEN 500 MG
1000 TABLET ORAL
Status: COMPLETED | OUTPATIENT
Start: 2020-06-14 | End: 2020-06-14

## 2020-06-14 RX ORDER — ENOXAPARIN SODIUM 100 MG/ML
1 INJECTION SUBCUTANEOUS ONCE
Status: COMPLETED | OUTPATIENT
Start: 2020-06-14 | End: 2020-06-14

## 2020-06-14 RX ORDER — SODIUM CHLORIDE 9 MG/ML
500 INJECTION, SOLUTION INTRAVENOUS CONTINUOUS
Status: DISPENSED | OUTPATIENT
Start: 2020-06-14 | End: 2020-06-14

## 2020-06-14 RX ORDER — ONDANSETRON 2 MG/ML
4 INJECTION INTRAMUSCULAR; INTRAVENOUS
Status: COMPLETED | OUTPATIENT
Start: 2020-06-14 | End: 2020-06-14

## 2020-06-14 RX ORDER — ACETAMINOPHEN 325 MG/1
650 TABLET ORAL
Status: DISCONTINUED | OUTPATIENT
Start: 2020-06-14 | End: 2020-06-18 | Stop reason: HOSPADM

## 2020-06-14 RX ORDER — ONDANSETRON 2 MG/ML
INJECTION INTRAMUSCULAR; INTRAVENOUS
Status: COMPLETED
Start: 2020-06-14 | End: 2020-06-14

## 2020-06-14 RX ORDER — SODIUM CHLORIDE 0.9 % (FLUSH) 0.9 %
5-40 SYRINGE (ML) INJECTION AS NEEDED
Status: DISCONTINUED | OUTPATIENT
Start: 2020-06-14 | End: 2020-06-18 | Stop reason: HOSPADM

## 2020-06-14 RX ORDER — ALBUTEROL SULFATE 0.83 MG/ML
2.5 SOLUTION RESPIRATORY (INHALATION)
Status: DISCONTINUED | OUTPATIENT
Start: 2020-06-14 | End: 2020-06-15

## 2020-06-14 RX ADMIN — MAGNESIUM SULFATE HEPTAHYDRATE 4 G: 40 INJECTION, SOLUTION INTRAVENOUS at 15:53

## 2020-06-14 RX ADMIN — ACETAMINOPHEN 1000 MG: 500 TABLET ORAL at 15:31

## 2020-06-14 RX ADMIN — ONDANSETRON 4 MG: 2 INJECTION INTRAMUSCULAR; INTRAVENOUS at 15:31

## 2020-06-14 RX ADMIN — ALBUTEROL SULFATE 2.5 MG: 2.5 SOLUTION RESPIRATORY (INHALATION) at 23:02

## 2020-06-14 RX ADMIN — ENOXAPARIN SODIUM 77 MG: 80 INJECTION SUBCUTANEOUS at 20:42

## 2020-06-14 RX ADMIN — Medication 10 ML: at 21:18

## 2020-06-14 RX ADMIN — SODIUM CHLORIDE 1000 ML: 900 INJECTION, SOLUTION INTRAVENOUS at 15:16

## 2020-06-14 RX ADMIN — AZITHROMYCIN 500 MG: 500 INJECTION, POWDER, LYOPHILIZED, FOR SOLUTION INTRAVENOUS at 21:18

## 2020-06-14 RX ADMIN — SODIUM CHLORIDE 500 ML/HR: 9 INJECTION, SOLUTION INTRAVENOUS at 22:24

## 2020-06-14 RX ADMIN — SODIUM CHLORIDE 1000 ML: 900 INJECTION, SOLUTION INTRAVENOUS at 15:30

## 2020-06-14 RX ADMIN — CEFTRIAXONE SODIUM 1 G: 1 INJECTION, POWDER, FOR SOLUTION INTRAMUSCULAR; INTRAVENOUS at 17:19

## 2020-06-14 NOTE — ED TRIAGE NOTES
PT arrivers per EMS from home with complaints of fever, cough and weakness. No OTC medications today. States productive green cough x 1 week and started today with nausea and vomiting.

## 2020-06-14 NOTE — PROGRESS NOTES
Patient reports to this RN that he had two bags of home medications with him in the ED that the nurse took to look over, per the patient's report. Called ED and spoke with Chavez Galloway, who is day shift charge RN since the nurse who was caring for this patient has already left for the evening. Informed Cleo Estrada that patient reports that he had his medications with him in the ED but that the nurse took them to look over and never returned them. Cleo Estrada advised that she would look to see if they are still down there and would call back if they find them. Informed patient that ED was notified about his home medications being left down there and told patient that we would let him know if they are sent up from ED.

## 2020-06-14 NOTE — PROGRESS NOTES
Spoke with Michael in the lab and confirmed that the patient's emergent disease panel swab was collected in the ED and sent to lab.

## 2020-06-14 NOTE — ED PROVIDER NOTES
Patient presents to the ER complaining of fever, weakness and shortness of breath. Patient states symptoms started yesterday with fevers and chills. Reports he has had some cough. Reports vomiting and diarrhea. The history is provided by the patient. Fever    This is a new problem. The current episode started yesterday. The maximum temperature noted was 103 - 104 F. Associated symptoms include diarrhea, vomiting, muscle aches, cough and shortness of breath. Pertinent negatives include no rash and no urinary symptoms. Past Medical History:   Diagnosis Date    Asthma 2007    last exacerbation 5 yrs ago; very well controlled.  BPH (benign prostatic hypertrophy)     Cancer (HCC)     stomach(2004);  prostate cancer (2008)    Chronic kidney disease 2004    followed by [facility] (Dr. Pop Barbosa)    COPD (chronic obstructive pulmonary disease) ([facility] 7/28/2011    GERD (gastroesophageal reflux disease)     controlled w/pepcid    Gout     Hemorrhoids, internal     History of diverticulosis     Hypercholesteremia     Hypertension     controlled w/med. Last echo (9/14/10):EF 55-60%, normal LVSF. Stress test (9/14/10): EF 56%,mild inferoseptal scar.     Muscle spasm     OA (osteoarthritis)     REZA (obstructive sleep apnea) 7/28/2011    no cpap    Osteoporosis     Personal history of colonic polyps     Prediabetes     Wears dentures        Past Surgical History:   Procedure Laterality Date    BIOPSY PROSTATE  2008    treated w/injections every 6months by Dr. Chasity Manzo HX GASTRECTOMY  2004    RESECTION FOR CANCER    HX KNEE REPLACEMENT Bilateral     HX PROSTATECTOMY  2002    SUPRAPUBIC    SINUS SURGERY [address] UNLISTED  2005    MUCORMYCOSIS         Family History:   Problem Relation Age of Onset    Hypertension Mother     No Known Problems Father         pt does not know him    Hypertension Sister        Social History     Socioeconomic History    Marital status: LEGALLY      Spouse name: Not on file    Number of children: Not on file    Years of education: Not on file    Highest education level: Not on file   Occupational History    Not on file   Social Needs    Financial resource strain: Not on file    Food insecurity     Worry: Not on file     Inability: Not on file    Transportation needs     Medical: Not on file     Non-medical: Not on file   Tobacco Use    Smoking status: Never Smoker    Smokeless tobacco: Never Used    Tobacco comment: cigars   Substance and Sexual Activity    Alcohol use: No    Drug use: No    Sexual activity: Not Currently   Lifestyle    Physical activity     Days per week: Not on file     Minutes per session: Not on file    Stress: Not on file   Relationships    Social connections     Talks on phone: Not on file     Gets together: Not on file     Attends [employer] service: Not on file     Active member of club or organization: Not on file     Attends meetings of clubs or organizations: Not on file     Relationship status: Not on file    Intimate partner violence     Fear of current or ex partner: Not on file     Emotionally abused: Not on file     Physically abused: Not on file     Forced sexual activity: Not on file   Other Topics Concern    Not on file   Social History Narrative    Not on file         ALLERGIES: Patient has no known allergies. Review of Systems   Constitutional: Positive for fatigue and fever. Eyes: Negative for photophobia, redness and visual disturbance. Respiratory: Positive for cough and shortness of breath. Negative for chest tightness. Cardiovascular: Negative for leg swelling. Gastrointestinal: Positive for diarrhea and vomiting. Musculoskeletal: Negative for back pain. Skin: Negative for rash. Neurological: Negative for syncope and weakness. Hematological: Does not bruise/bleed easily. Psychiatric/Behavioral: Negative for behavioral problems and confusion.    All other systems reviewed and are negative. Vitals:    06/14/20 1510   BP: 170/86   Pulse: 71   Resp: 18   Temp: (!) 103.3 °F (39.6 °C)   SpO2: 98%   Weight: 77.1 kg (170 lb)   Height: 5' 8\" (1.727 m)            Physical Exam  Vitals signs and nursing note reviewed. Constitutional:       Appearance: Normal appearance. HENT:      Head: Normocephalic and atraumatic. Neck:      Musculoskeletal: Normal range of motion and neck supple. Cardiovascular:      Rate and Rhythm: Normal rate and regular rhythm. Pulses: Normal pulses. Heart sounds: Normal heart sounds. Pulmonary:      Effort: Pulmonary effort is normal.      Breath sounds: Wheezing and rhonchi present. Abdominal:      General: Abdomen is flat. Bowel sounds are normal.      Palpations: Abdomen is soft. Tenderness: There is no abdominal tenderness. Musculoskeletal: Normal range of motion. General: No swelling or tenderness. Skin:     General: Skin is warm and dry. Coloration: Skin is not jaundiced or pale. Neurological:      General: No focal deficit present. Mental Status: He is alert. Mental status is at baseline. MDM  Number of Diagnoses or Management Options  Acute febrile illness:   Chronic kidney disease, unspecified CKD stage:   Suspected COVID-19 virus infection:   Diagnosis management comments: Differential diagnosis includes pneumonia, upper lobe pneumonia, sepsis    3:50 PM  White count is 7.3 with 88% granulocytes      Patient's room air sats have been between 88 and 92%. Currently on 2 L and sats are improved    Chest x-ray is read as clear. Patient will be swab for coronavirus. Will treat Rocephin and azithromycin.     Discussed case with hospitalist for admission       Amount and/or Complexity of Data Reviewed  Clinical lab tests: ordered and reviewed  Tests in the radiology section of CPT®: ordered and reviewed  Discuss the patient with other providers: yes    Risk of Complications, Morbidity, and/or Mortality  Presenting problems: high  Diagnostic procedures: moderate  Management options: high    Patient Progress  Patient progress: stable         Procedures    Results Include:    Recent Results (from the past 24 hour(s))   CBC WITH AUTOMATED DIFF    Collection Time: 06/14/20  3:25 PM   Result Value Ref Range    WBC 7.3 4.3 - 11.1 K/uL    RBC 5.40 4.23 - 5.6 M/uL    HGB 14.5 13.6 - 17.2 g/dL    HCT 45.9 41.1 - 50.3 %    MCV 85.0 79.6 - 97.8 FL    MCH 26.9 26.1 - 32.9 PG    MCHC 31.6 31.4 - 35.0 g/dL    RDW 15.1 (H) 11.9 - 14.6 %    PLATELET 257 856 - 155 K/uL    MPV 10.7 9.4 - 12.3 FL    ABSOLUTE NRBC 0.00 0.0 - 0.2 K/uL    DF AUTOMATED      NEUTROPHILS 88 (H) 43 - 78 %    LYMPHOCYTES 5 (L) 13 - 44 %    MONOCYTES 1 (L) 4.0 - 12.0 %    EOSINOPHILS 5 0.5 - 7.8 %    BASOPHILS 1 0.0 - 2.0 %    IMMATURE GRANULOCYTES 0 0.0 - 5.0 %    ABS. NEUTROPHILS 6.4 1.7 - 8.2 K/UL    ABS. LYMPHOCYTES 0.4 (L) 0.5 - 4.6 K/UL    ABS. MONOCYTES 0.1 0.1 - 1.3 K/UL    ABS. EOSINOPHILS 0.4 0.0 - 0.8 K/UL    ABS. BASOPHILS 0.0 0.0 - 0.2 K/UL    ABS. IMM. GRANS. 0.0 0.0 - 0.5 K/UL   METABOLIC PANEL, COMPREHENSIVE    Collection Time: 06/14/20  3:25 PM   Result Value Ref Range    Sodium 140 136 - 145 mmol/L    Potassium 4.4 3.5 - 5.1 mmol/L    Chloride 109 (H) 98 - 107 mmol/L    CO2 21 21 - 32 mmol/L    Anion gap 10 7 - 16 mmol/L    Glucose 100 65 - 100 mg/dL    BUN 26 (H) 8 - 23 MG/DL    Creatinine 2.05 (H) 0.8 - 1.5 MG/DL    GFR est AA 40 (L) >60 ml/min/1.73m2    GFR est non-AA 33 (L) >60 ml/min/1.73m2    Calcium 8.8 8.3 - 10.4 MG/DL    Bilirubin, total 0.6 0.2 - 1.1 MG/DL    ALT (SGPT) 27 12 - 65 U/L    AST (SGOT) 63 (H) 15 - 37 U/L    Alk.  phosphatase 153 (H) 50 - 136 U/L    Protein, total 8.0 6.3 - 8.2 g/dL    Albumin 3.0 (L) 3.2 - 4.6 g/dL    Globulin 5.0 (H) 2.3 - 3.5 g/dL    A-G Ratio 0.6 (L) 1.2 - 3.5     LACTIC ACID    Collection Time: 06/14/20  3:25 PM   Result Value Ref Range    Lactic acid 2.7 (HH) 0.4 - 2.0 MMOL/L PROCALCITONIN    Collection Time: 06/14/20  3:27 PM   Result Value Ref Range    Procalcitonin 0.22 ng/mL     Voice dictation software was used during the making of this note. This software is not perfect and grammatical and other typographical errors may be present. This note has been proofread, but may still contain errors.   Tatianna Arevalo MD; 6/14/2020 @4:54 PM   ===================================================================

## 2020-06-14 NOTE — PROGRESS NOTES
Patient arrived to floor on stretcher via patient transport. Patient alert and oriented with respirations even and unlabored on 3L NC. Patient oriented to unit, call light system, and surroundings. Instructed patient to call for assistance prior to ambulating, to which he verbalized understanding. Patient denies any needs at this time. Bed low and locked. Bedside table, personal belongings and call light within reach.

## 2020-06-14 NOTE — H&P
Hospitalist Note     Admit Date:  2020  3:06 PM   Name:  Alexi Reinoso   Age:  80 y.o.  :  12/15/1933   MRN:  [id number]   PCP:  Zygmunt Mcburney, MD  Treatment Team: Primary Nurse: Joshua Gonsalez    HPI/Subjective:   Chief complaint fever weakness cough and shortness of breath    Patient is 80-year-old male with past medical history significant for hypertension, Asthma, gastric cancer status post resection, chronic kidney disease stage III, GERD presented to the ER because of fever weakness and shortness of breath. Patient stated that for the last 2 days he has been having fever and chills. Unsure of his temperature , but it was 103 -104 F. He has cough productive with white-colored phlegm. Shortness of breath at rest worse with activity. No orthopnea no paroxysmal nocturnal dyspnea. No chest pain no palpitations. No dysuria urgency or frequency of urination. He reported nonbilious, nonbloody vomiting and loose stools. No abdominal pain. He also noticed generalized weakness with muscle aches. No tingling numbness or focal weakness. 10 systems reviewed and negative except as noted in HPI. ER course  Fever with T-max of 103.34 night. Patient is not tachycardic, blood pressure 170/86 and room air oxygen saturations of 88 to 92% currently on 2 - 3 L oxygen saturation 98%. CBC with white count of 7.3 with lymphopenia    CMP with sodium of 140 potassium 4.4 bicarbonate of 21 BUN 26 creatinine 2.05,     ALT 27 AST 63, lactic acid of 2.7, procalcitonin 0.22,    Chest x-ray on admission negative for acute cardiopulmonary abnormality. Urine analysis ordered. Blood cultures ordered. Fluids per sepsis protocol 30 mL/kg normal saline ordered. A dose of ceftriaxone and azithromycin ordered. Patient admitted for further evaluation management of suspected COVID-19 infection.     Past Medical History:   Diagnosis Date    Asthma 2007    last exacerbation 5 yrs ago; very well controlled.  BPH (benign prostatic hypertrophy)     Cancer (HCC)     stomach(2004);  prostate cancer (2008)    Chronic kidney disease 2004    followed by [facility] (Dr. Dina Mendoza)    COPD (chronic obstructive pulmonary disease) ([facility] 7/28/2011    GERD (gastroesophageal reflux disease)     controlled w/pepcid    Gout     Hemorrhoids, internal     History of diverticulosis     Hypercholesteremia     Hypertension     controlled w/med. Last echo (9/14/10):EF 55-60%, normal LVSF. Stress test (9/14/10): EF 56%,mild inferoseptal scar.     Muscle spasm     OA (osteoarthritis)     REZA (obstructive sleep apnea) 7/28/2011    no cpap    Osteoporosis     Personal history of colonic polyps     Prediabetes     Wears dentures       Past Surgical History:   Procedure Laterality Date    BIOPSY PROSTATE  2008    treated w/injections every 6months by Dr. Farrell Flatness HX GASTRECTOMY  2004    RESECTION FOR CANCER    HX KNEE REPLACEMENT Bilateral     HX PROSTATECTOMY  2002    SUPRAPUBIC    SINUS SURGERY [address] UNLISTED  2005    MUCORMYCOSIS      No Known Allergies   Social History     Tobacco Use    Smoking status: Never Smoker    Smokeless tobacco: Never Used    Tobacco comment: cigars   Substance Use Topics    Alcohol use: No      Family History   Problem Relation Age of Onset    Hypertension Mother     No Known Problems Father         pt does not know him    Hypertension Sister       Immunization History   Administered Date(s) Administered    (RETIRED) Pneumococcal Vaccine (Unspecified Type) 08/13/2010    Influenza High Dose Vaccine PF 10/02/2019, 01/17/2020    Influenza Vaccine 01/01/2014, 09/24/2018    Influenza Vaccine (Tri) Adjuvanted 10/04/2018    Pneumococcal Conjugate (PCV-13) 07/17/2017, 01/17/2020    Pneumococcal Polysaccharide (PPSV-23) 03/22/2013    Pneumococcal Vaccine (Unspecified Type) 03/22/2013    Tdap 04/02/2008    Zoster Recombinant 09/16/2019    Zoster Vaccine, Live 2014     PTA Medications:  Prior to Admission Medications   Prescriptions Last Dose Informant Patient Reported? Taking? QUEtiapine (SEROQUEL) 25 mg tablet   No No   Sig: Take 1 Tab by mouth nightly. albuterol (ProAir HFA) 90 mcg/actuation inhaler   No No   Sig: Take 1 Puff by inhalation every four (4) hours as needed for Shortness of Breath for up to 90 days. Ventolin inhaler   amLODIPine (NORVASC) 10 mg tablet   No No   Sig: TAKE 1 TAB BY MOUTH DAILY FOR HIGH BLOOD PRESSURE   calcitRIOL (ROCALTROL) 0.5 mcg capsule   Yes No   cholecalciferol, vitamin D3, (VITAMIN D3) 2,000 unit tab   Yes No   Sig: Take  by mouth. Indications: OTC   cyanocobalamin (VITAMIN B12) 1,000 mcg/mL injection   Yes No   Si,000 mcg by IntraMUSCular route once. famotidine (PEPCID) 20 mg tablet   Yes No   fexofenadine (Allegra Allergy) 180 mg tablet   No No   Sig: Take 1 Tab by mouth daily. fluticasone furoate-vilanteroL (Breo Ellipta) 100-25 mcg/dose inhaler   No No   Sig: INHALE ONE PUFF BY MOUTH ONCE DAILY. fluticasone propionate (Flonase Allergy Relief) 50 mcg/actuation nasal spray   No No   Si sprays in each nostril once a day   metoprolol succinate (TOPROL-XL) 50 mg XL tablet   No No   Sig: TAKE ONE TABLET BY MOUTH ONCE DAILY. sertraline (ZOLOFT) 100 mg tablet   No No   Sig: Take 1.5 Tabs by mouth daily. sodium bicarbonate 650 mg tablet   Yes No   Sig: TAKE 2 TABLETS BY MOUTH 3 TIMES A DAY   tadalafil (CIALIS) 20 mg tablet   Yes No   Sig: Take 20 mg by mouth as needed. tiZANidine (ZANAFLEX) 4 mg tablet   No No   Sig: Take 1 Tab by mouth three (3) times daily as needed (back pain). traZODone (DESYREL) 50 mg tablet   No No   Sig: Take 1-2 Tabs by mouth nightly.       Facility-Administered Medications: None       Objective:     Patient Vitals for the past 24 hrs:   Temp Pulse Resp BP SpO2   20 1716 (!) 100.9 °F (38.3 °C)       06/14/20 1714  (!) 58  127/63 97 %   20 1700  (!) 59  153/69 99 % 06/14/20 1629  61  149/65 92 %   06/14/20 1614  60  152/70 99 %   06/14/20 1544  66  126/61 97 %   06/14/20 1510 (!) 103.3 °F (39.6 °C) 71 18 170/86 98 %     Oxygen Therapy  O2 Sat (%): 97 % (06/14/20 1714)  Pulse via Oximetry: 58 beats per minute (06/14/20 1714)  O2 Device: Nasal cannula (06/14/20 1510)  O2 Flow Rate (L/min): 3 l/min (06/14/20 1510)    No intake or output data in the 24 hours ending 06/14/20 1808    *Note that automatically entered I/Os may not be accurate; dependent on patient compliance with collection and accurate  by assistants. Physical Exam:  General:    Elderly, patient unable to speak in full sentences, increased work of breathing, tachypneic, currently on 2 to 3 L oxygen by nasal cannula,  Eyes:   Normal sclerae. Extraocular movements intact. HENT:  Normocephalic, atraumatic. Moist mucous membranes  CV:   RRR. No m/r/g. Lungs:  Clear to auscultation bilateral, no rhonchi rales wheeze,  Abdomen: Soft, nontender, nondistended. Active bowel sounds, no organomegaly,   Extremities: Warm and dry. No cyanosis or edema. Neurologic: CN II-XII grossly intact. Sensation intact. Skin:     No rashes or jaundice. Normal coloration  Psych:  Normal mood and affect. I reviewed the labs, imaging, EKGs, telemetry, and other studies done this admission.   Data Review:   Recent Results (from the past 24 hour(s))   CBC WITH AUTOMATED DIFF    Collection Time: 06/14/20  3:25 PM   Result Value Ref Range    WBC 7.3 4.3 - 11.1 K/uL    RBC 5.40 4.23 - 5.6 M/uL    HGB 14.5 13.6 - 17.2 g/dL    HCT 45.9 41.1 - 50.3 %    MCV 85.0 79.6 - 97.8 FL    MCH 26.9 26.1 - 32.9 PG    MCHC 31.6 31.4 - 35.0 g/dL    RDW 15.1 (H) 11.9 - 14.6 %    PLATELET 210 310 - 430 K/uL    MPV 10.7 9.4 - 12.3 FL    ABSOLUTE NRBC 0.00 0.0 - 0.2 K/uL    DF AUTOMATED      NEUTROPHILS 88 (H) 43 - 78 %    LYMPHOCYTES 5 (L) 13 - 44 %    MONOCYTES 1 (L) 4.0 - 12.0 %    EOSINOPHILS 5 0.5 - 7.8 %    BASOPHILS 1 0.0 - 2.0 %    IMMATURE GRANULOCYTES 0 0.0 - 5.0 %    ABS. NEUTROPHILS 6.4 1.7 - 8.2 K/UL    ABS. LYMPHOCYTES 0.4 (L) 0.5 - 4.6 K/UL    ABS. MONOCYTES 0.1 0.1 - 1.3 K/UL    ABS. EOSINOPHILS 0.4 0.0 - 0.8 K/UL    ABS. BASOPHILS 0.0 0.0 - 0.2 K/UL    ABS. IMM. GRANS. 0.0 0.0 - 0.5 K/UL   METABOLIC PANEL, COMPREHENSIVE    Collection Time: 06/14/20  3:25 PM   Result Value Ref Range    Sodium 140 136 - 145 mmol/L    Potassium 4.4 3.5 - 5.1 mmol/L    Chloride 109 (H) 98 - 107 mmol/L    CO2 21 21 - 32 mmol/L    Anion gap 10 7 - 16 mmol/L    Glucose 100 65 - 100 mg/dL    BUN 26 (H) 8 - 23 MG/DL    Creatinine 2.05 (H) 0.8 - 1.5 MG/DL    GFR est AA 40 (L) >60 ml/min/1.73m2    GFR est non-AA 33 (L) >60 ml/min/1.73m2    Calcium 8.8 8.3 - 10.4 MG/DL    Bilirubin, total 0.6 0.2 - 1.1 MG/DL    ALT (SGPT) 27 12 - 65 U/L    AST (SGOT) 63 (H) 15 - 37 U/L    Alk.  phosphatase 153 (H) 50 - 136 U/L    Protein, total 8.0 6.3 - 8.2 g/dL    Albumin 3.0 (L) 3.2 - 4.6 g/dL    Globulin 5.0 (H) 2.3 - 3.5 g/dL    A-G Ratio 0.6 (L) 1.2 - 3.5     LACTIC ACID    Collection Time: 06/14/20  3:25 PM   Result Value Ref Range    Lactic acid 2.7 (HH) 0.4 - 2.0 MMOL/L   TROPONIN-HIGH SENSITIVITY    Collection Time: 06/14/20  3:25 PM   Result Value Ref Range    Troponin-High Sensitivity 9.8 0 - 14 pg/mL   PROCALCITONIN    Collection Time: 06/14/20  3:27 PM   Result Value Ref Range    Procalcitonin 0.22 ng/mL   LIPASE    Collection Time: 06/14/20  3:28 PM   Result Value Ref Range    Lipase 74 73 - 393 U/L   FERRITIN    Collection Time: 06/14/20  3:28 PM   Result Value Ref Range    Ferritin 55 8 - 388 NG/ML   LD    Collection Time: 06/14/20  3:28 PM   Result Value Ref Range     (H) 110 - 210 U/L   C REACTIVE PROTEIN, QT    Collection Time: 06/14/20  3:28 PM   Result Value Ref Range    C-Reactive protein 0.9 0.0 - 0.9 mg/dL   MAGNESIUM    Collection Time: 06/14/20  3:30 PM   Result Value Ref Range    Magnesium 2.0 1.8 - 2.4 mg/dL       All Micro Results Procedure Component Value Units Date/Time    EMERGENT DISEASE PANEL [[id number]]     Order Status:  Sent     CULTURE, BLOOD [[id number]] Collected:  06/14/20 1530    Order Status:  Completed Specimen:  Blood Updated:  06/14/20 1539    CULTURE, BLOOD [[id number]] Collected:  06/14/20 1526    Order Status:  Completed Specimen:  Blood Updated:  06/14/20 1539          Current Facility-Administered Medications   Medication Dose Route Frequency    azithromycin (ZITHROMAX) 500 mg in 0.9% sodium chloride (MBP/ADV) 250 mL  500 mg IntraVENous NOW    acetaminophen (TYLENOL) tablet 650 mg  650 mg Oral Q6H PRN    Or    acetaminophen (TYLENOL) suppository 650 mg  650 mg Rectal Q6H PRN    heparin (porcine) injection 5,000 Units  5,000 Units SubCUTAneous Q8H    sodium chloride (NS) flush 5-40 mL  5-40 mL IntraVENous Q8H    sodium chloride (NS) flush 5-40 mL  5-40 mL IntraVENous PRN    [START ON 6/15/2020] azithromycin (ZITHROMAX) 500 mg in 0.9% sodium chloride (MBP/ADV) 250 mL  500 mg IntraVENous Q24H    [START ON 6/15/2020] cefTRIAXone (ROCEPHIN) 2 g in 0.9% sodium chloride (MBP/ADV) 50 mL  2 g IntraVENous Q24H     Current Outpatient Medications   Medication Sig    albuterol (ProAir HFA) 90 mcg/actuation inhaler Take 1 Puff by inhalation every four (4) hours as needed for Shortness of Breath for up to 90 days. Ventolin inhaler    fluticasone furoate-vilanteroL (Breo Ellipta) 100-25 mcg/dose inhaler INHALE ONE PUFF BY MOUTH ONCE DAILY.  fexofenadine (Allegra Allergy) 180 mg tablet Take 1 Tab by mouth daily.  fluticasone propionate (Flonase Allergy Relief) 50 mcg/actuation nasal spray 2 sprays in each nostril once a day    sodium bicarbonate 650 mg tablet TAKE 2 TABLETS BY MOUTH 3 TIMES A DAY    traZODone (DESYREL) 50 mg tablet Take 1-2 Tabs by mouth nightly.  QUEtiapine (SEROQUEL) 25 mg tablet Take 1 Tab by mouth nightly.  sertraline (ZOLOFT) 100 mg tablet Take 1.5 Tabs by mouth daily.     metoprolol succinate (TOPROL-XL) 50 mg XL tablet TAKE ONE TABLET BY MOUTH ONCE DAILY.  amLODIPine (NORVASC) 10 mg tablet TAKE 1 TAB BY MOUTH DAILY FOR HIGH BLOOD PRESSURE    tadalafil (CIALIS) 20 mg tablet Take 20 mg by mouth as needed.  cyanocobalamin (VITAMIN B12) 1,000 mcg/mL injection 1,000 mcg by IntraMUSCular route once.  cholecalciferol, vitamin D3, (VITAMIN D3) 2,000 unit tab Take  by mouth. Indications: OTC    tiZANidine (ZANAFLEX) 4 mg tablet Take 1 Tab by mouth three (3) times daily as needed (back pain).  famotidine (PEPCID) 20 mg tablet     calcitRIOL (ROCALTROL) 0.5 mcg capsule        Other Studies:  Xr Chest Port    Result Date: 6/14/2020  History: Fever, productive cough Exam: portable chest Comparison: 2/28/2019 Findings: No new alveolar infiltrate or pleural effusion. No change in the appearance of the mediastinal contour or osseous structures.  Impressions: Stable portable chest       Assessment and Plan:     Hospital Problems as of 6/14/2020 Date Reviewed: 7/31/2018          Codes Class Noted - Resolved POA    * (Principal) Sepsis ([facility] ICD-10-CM: A41.9  ICD-9-CM: 038.9, 995.91  6/14/2020 - Present Unknown        Suspected COVID-19 virus infection ICD-10-CM: Z20.828  ICD-9-CM: V01.79  6/14/2020 - Present Unknown        Acute hypoxemic respiratory failure ([facility] ICD-10-CM: J96.01  ICD-9-CM: 518.81  6/14/2020 - Present Unknown        Lactic acidosis ICD-10-CM: E87.2  ICD-9-CM: 276.2  6/14/2020 - Present Unknown        CKD (chronic kidney disease) stage 3, GFR 30-59 ml/min ([facility]) ICD-10-CM: N18.3  ICD-9-CM: 585.3  8/23/2019 - Present Yes        Gastroesophageal reflux disease without esophagitis ICD-10-CM: K21.9  ICD-9-CM: 530.81  4/20/2018 - Present Yes        Hypertensive CKD (chronic kidney disease) ICD-10-CM: I12.9  ICD-9-CM: 403.90  7/28/2011 - Present Yes        REZA (obstructive sleep apnea) ICD-10-CM: K79.29  ICD-9-CM: 327.23  7/28/2011 - Present Yes        COPD (chronic obstructive pulmonary disease) [facility]) ICD-10-CM: J44.9  ICD-9-CM: 554  7/28/2011 - Present Yes              Plan: This 51-year-old male with:     Sepsis of unknown origin:  Patient meets criteria for sepsis, he is febrile, no tachycardia no leukocytosis, tachypneic, and has lactic acidosis, mildly elevated procalcitonin. Blood cultures done in ER. IV ceftriaxone and azithromycin. IV fluids per sepsis protocol 30 mL/kg, received in the ER. Will hold off on maintenance fluids given concerns for COVID-19. Will check urine analysis. Acute hypoxemic respiratory failure secondary to suspected COVID-19 virus infection. Wean oxygen as tolerated. Emergent disease panel ordered. Chest x-ray negative. Proventil, ceftriaxone, azithromycin. Wean oxygen as tolerated. Will check LDH, ferritin, lactic acid, LDH, d-dimer. Suspected COVID-19 infection  As above. Lactic acidosis  Repeat lactic acid until less than 2. CKD stage III  Creatinine at baseline.     COPD/asthma  Albuterol as needed    Discharge planning: Medical floor inpatient    DVT ppx: Heparin subcu    Code status:  DNR    DPOA: Patient's wife [phone]    Estimated LOS:  Greater than 2 midnights    Risk:  high    Signed:  Pepito Mckeon MD

## 2020-06-14 NOTE — PROGRESS NOTES
Received a call from [address] [facility] in the lab at [address] notifying of a critical d dimer of 1.85. Results read back for verification. Notified Dr. Elana Quintanilla at [phone] via [place]. Dr. Elana Quintanilla placed new orders for lovenox at [address] [place] [address]. Will follow new orders.

## 2020-06-14 NOTE — ED NOTES
TRANSFER - OUT REPORT:    Verbal report given to Via Zackery Carroll, RN(name) on [facility]  being transferred to [facility](unit) for routine progression of care       Report consisted of patients Situation, Background, Assessment and   Recommendations(SBAR). Information from the following report(s) SBAR, Kardex, ED Summary, [facility] and Recent Results was reviewed with the receiving nurse. Lines:   Saline Lock 06/14/20 Left Forearm (Active)        Opportunity for questions and clarification was provided.       Patient transported with:   [employer]

## 2020-06-15 ENCOUNTER — APPOINTMENT (OUTPATIENT)
Dept: CT IMAGING | Age: 85
DRG: 871 | End: 2020-06-15
Attending: HOSPITALIST
Payer: COMMERCIAL

## 2020-06-15 LAB
ACC. NO. FROM MICRO ORDER, ACCP: ABNORMAL
ALBUMIN SERPL-MCNC: 2.5 G/DL (ref 3.2–4.6)
ALBUMIN/GLOB SERPL: 0.6 {RATIO} (ref 1.2–3.5)
ALP SERPL-CCNC: 125 U/L (ref 50–136)
ALT SERPL-CCNC: 24 U/L (ref 12–65)
ANION GAP SERPL CALC-SCNC: 8 MMOL/L (ref 7–16)
APTT PPP: 56.3 SEC (ref 24.3–35.4)
AST SERPL-CCNC: 43 U/L (ref 15–37)
BASOPHILS # BLD: 0 K/UL (ref 0–0.2)
BASOPHILS NFR BLD: 0 % (ref 0–2)
BILIRUB SERPL-MCNC: 0.4 MG/DL (ref 0.2–1.1)
BUN SERPL-MCNC: 26 MG/DL (ref 8–23)
CALCIUM SERPL-MCNC: 7.7 MG/DL (ref 8.3–10.4)
CHLORIDE SERPL-SCNC: 110 MMOL/L (ref 98–107)
CO2 SERPL-SCNC: 22 MMOL/L (ref 21–32)
CREAT SERPL-MCNC: 2.17 MG/DL (ref 0.8–1.5)
D DIMER PPP FEU-MCNC: 1.33 UG/ML(FEU)
DIFFERENTIAL METHOD BLD: ABNORMAL
EOSINOPHIL # BLD: 0.6 K/UL (ref 0–0.8)
EOSINOPHIL NFR BLD: 5 % (ref 0.5–7.8)
ERYTHROCYTE [DISTWIDTH] IN BLOOD BY AUTOMATED COUNT: 15.1 % (ref 11.9–14.6)
ESCHERICHIA COLI: DETECTED
FERRITIN SERPL-MCNC: 67 NG/ML (ref 8–388)
FIBRINOGEN PPP-MCNC: 494 MG/DL (ref 190–501)
GLOBULIN SER CALC-MCNC: 4 G/DL (ref 2.3–3.5)
GLUCOSE SERPL-MCNC: 92 MG/DL (ref 65–100)
HCT VFR BLD AUTO: 31.6 % (ref 41.1–50.3)
HGB BLD-MCNC: 10.8 G/DL (ref 13.6–17.2)
IMM GRANULOCYTES # BLD AUTO: 0.1 K/UL (ref 0–0.5)
IMM GRANULOCYTES NFR BLD AUTO: 0 % (ref 0–5)
INR PPP: 1.2
INTERPRETATION: ABNORMAL
KPC (CARBAPENEM RESISTANCE GENE): NOT DETECTED
LACTATE SERPL-SCNC: 1 MMOL/L (ref 0.4–2)
LDH SERPL L TO P-CCNC: 181 U/L (ref 110–210)
LYMPHOCYTES # BLD: 1.7 K/UL (ref 0.5–4.6)
LYMPHOCYTES NFR BLD: 14 % (ref 13–44)
MCH RBC QN AUTO: 27.9 PG (ref 26.1–32.9)
MCHC RBC AUTO-ENTMCNC: 34.2 G/DL (ref 31.4–35)
MCV RBC AUTO: 81.7 FL (ref 79.6–97.8)
MONOCYTES # BLD: 1.2 K/UL (ref 0.1–1.3)
MONOCYTES NFR BLD: 10 % (ref 4–12)
NEUTS SEG # BLD: 8.9 K/UL (ref 1.7–8.2)
NEUTS SEG NFR BLD: 71 % (ref 43–78)
NRBC # BLD: 0 K/UL (ref 0–0.2)
PLATELET # BLD AUTO: 183 K/UL (ref 150–450)
PMV BLD AUTO: 10.8 FL (ref 9.4–12.3)
POTASSIUM SERPL-SCNC: 4.4 MMOL/L (ref 3.5–5.1)
PROT SERPL-MCNC: 6.5 G/DL (ref 6.3–8.2)
PROTHROMBIN TIME: 15.2 SEC (ref 12–14.7)
RBC # BLD AUTO: 3.87 M/UL (ref 4.23–5.6)
SODIUM SERPL-SCNC: 140 MMOL/L (ref 136–145)
WBC # BLD AUTO: 12.4 K/UL (ref 4.3–11.1)

## 2020-06-15 PROCEDURE — 85730 THROMBOPLASTIN TIME PARTIAL: CPT

## 2020-06-15 PROCEDURE — 97165 OT EVAL LOW COMPLEX 30 MIN: CPT

## 2020-06-15 PROCEDURE — 74011000258 HC RX REV CODE- 258: Performed by: HOSPITALIST

## 2020-06-15 PROCEDURE — 77010033678 HC OXYGEN DAILY

## 2020-06-15 PROCEDURE — 74011636320 HC RX REV CODE- 636/320: Performed by: HOSPITALIST

## 2020-06-15 PROCEDURE — 85384 FIBRINOGEN ACTIVITY: CPT

## 2020-06-15 PROCEDURE — 85379 FIBRIN DEGRADATION QUANT: CPT

## 2020-06-15 PROCEDURE — 97530 THERAPEUTIC ACTIVITIES: CPT

## 2020-06-15 PROCEDURE — 85610 PROTHROMBIN TIME: CPT

## 2020-06-15 PROCEDURE — 83615 LACTATE (LD) (LDH) ENZYME: CPT

## 2020-06-15 PROCEDURE — 97161 PT EVAL LOW COMPLEX 20 MIN: CPT

## 2020-06-15 PROCEDURE — 83605 ASSAY OF LACTIC ACID: CPT

## 2020-06-15 PROCEDURE — 74011250636 HC RX REV CODE- 250/636: Performed by: HOSPITALIST

## 2020-06-15 PROCEDURE — 74011000250 HC RX REV CODE- 250: Performed by: HOSPITALIST

## 2020-06-15 PROCEDURE — 65270000029 HC RM PRIVATE

## 2020-06-15 PROCEDURE — 87086 URINE CULTURE/COLONY COUNT: CPT

## 2020-06-15 PROCEDURE — 74176 CT ABD & PELVIS W/O CONTRAST: CPT

## 2020-06-15 PROCEDURE — 74011250637 HC RX REV CODE- 250/637: Performed by: HOSPITALIST

## 2020-06-15 PROCEDURE — 82728 ASSAY OF FERRITIN: CPT

## 2020-06-15 PROCEDURE — 94640 AIRWAY INHALATION TREATMENT: CPT

## 2020-06-15 PROCEDURE — 94760 N-INVAS EAR/PLS OXIMETRY 1: CPT

## 2020-06-15 PROCEDURE — 85025 COMPLETE CBC W/AUTO DIFF WBC: CPT

## 2020-06-15 PROCEDURE — 80053 COMPREHEN METABOLIC PANEL: CPT

## 2020-06-15 RX ORDER — TRAZODONE HYDROCHLORIDE 50 MG/1
50 TABLET ORAL
Status: DISCONTINUED | OUTPATIENT
Start: 2020-06-15 | End: 2020-06-18 | Stop reason: HOSPADM

## 2020-06-15 RX ORDER — SODIUM BICARBONATE 650 MG/1
650 TABLET ORAL 3 TIMES DAILY
Status: DISCONTINUED | OUTPATIENT
Start: 2020-06-15 | End: 2020-06-18 | Stop reason: HOSPADM

## 2020-06-15 RX ORDER — LORATADINE 10 MG/1
10 TABLET ORAL DAILY
Status: DISCONTINUED | OUTPATIENT
Start: 2020-06-16 | End: 2020-06-18 | Stop reason: HOSPADM

## 2020-06-15 RX ORDER — HEPARIN SODIUM 5000 [USP'U]/ML
5000 INJECTION, SOLUTION INTRAVENOUS; SUBCUTANEOUS EVERY 8 HOURS
Status: DISCONTINUED | OUTPATIENT
Start: 2020-06-16 | End: 2020-06-18 | Stop reason: HOSPADM

## 2020-06-15 RX ORDER — CALCITRIOL 0.25 UG/1
0.5 CAPSULE ORAL DAILY
Status: DISCONTINUED | OUTPATIENT
Start: 2020-06-16 | End: 2020-06-18 | Stop reason: HOSPADM

## 2020-06-15 RX ADMIN — ACETAMINOPHEN 650 MG: 325 TABLET, FILM COATED ORAL at 17:43

## 2020-06-15 RX ADMIN — CEFTRIAXONE SODIUM 2 G: 2 INJECTION, POWDER, FOR SOLUTION INTRAMUSCULAR; INTRAVENOUS at 15:08

## 2020-06-15 RX ADMIN — DIATRIZOATE MEGLUMINE AND DIATRIZOATE SODIUM 15 ML: 660; 100 LIQUID ORAL; RECTAL at 10:15

## 2020-06-15 RX ADMIN — Medication 10 ML: at 21:42

## 2020-06-15 RX ADMIN — SODIUM BICARBONATE 650 MG TABLET 650 MG: at 21:42

## 2020-06-15 RX ADMIN — Medication 10 ML: at 13:54

## 2020-06-15 RX ADMIN — ALBUTEROL SULFATE 2.5 MG: 2.5 SOLUTION RESPIRATORY (INHALATION) at 12:00

## 2020-06-15 RX ADMIN — Medication 10 ML: at 05:36

## 2020-06-15 RX ADMIN — TRAZODONE HYDROCHLORIDE 50 MG: 50 TABLET ORAL at 21:42

## 2020-06-15 RX ADMIN — SODIUM BICARBONATE 650 MG TABLET 650 MG: at 15:08

## 2020-06-15 RX ADMIN — ALBUTEROL SULFATE 2.5 MG: 2.5 SOLUTION RESPIRATORY (INHALATION) at 09:25

## 2020-06-15 NOTE — PROGRESS NOTES
Pt resting in bed watching tv. Pt denies pain at this time. No signs or symptoms of acute distress. Call light within reach. Will continue to monitor.

## 2020-06-15 NOTE — PROGRESS NOTES
Timed lactic acid collected at approximately 2102 and sent to lab via tube system at approximately 2104.

## 2020-06-15 NOTE — PROGRESS NOTES
Received bedside shift report from [employer] nurse, Staci Sandifer. Patient resting quietly in bed at this time, awake, respirations even and unlabored on room air. Denies needs at this time. Bed low and locked. Bedside table, personal belongings and call light within reach.

## 2020-06-15 NOTE — PROGRESS NOTES
Received a call from [facility] in the lab at 2152 notifying of a critical lactic acid of 3.5. Results read back for verification. Notified Dr. Brandy Lee at 2152 via [employer] of patient's critical lactic acid of 3.5 and that the patient's lactic acid is trending upward from the previous lactic acid of 2.7. Dr. Brandy Lee placed a new order for a bolus of 1000cc normal saline. Will follow new orders.

## 2020-06-15 NOTE — PROGRESS NOTES
Spoke with Nessa, who states \"then do have the covid swab for this am\"  They will updates status on computer to show swab has been collected.

## 2020-06-15 NOTE — PROGRESS NOTES
Bedside shift report completed with oncoming nurse, [facility]. Patient resting quietly in bed at this time, awake, respirations even and unlabored. Denies needs at this time. Bed low and locked. Bedside table, personal belongings and call light within reach.

## 2020-06-15 NOTE — PROGRESS NOTES
Pt resting in bed watching tv. Pt now on RA. Headache pain improving after taking tylenol. No s/sx of distress noted. Call light within reach. Will continue to monitor.

## 2020-06-15 NOTE — PROGRESS NOTES
Problem: Mobility Impaired (Adult and Pediatric)  Goal: *Therapy Goal (Edit Goal, Insert Text)  Outcome: Progressing Towards Goal  Note:   LTG:  (1.)Mr. Tatiana Chapman will move from supine to sit and sit to supine , scoot up and down, and roll side to side in bed with INDEPENDENT within 7 treatment day(s). (2.)Mr. Tatiana Chapman will transfer from bed to chair and chair to bed with INDEPENDENT using the least restrictive device within 7 treatment day(s). (3.)Mr. Tatiana Chapman will ambulate with INDEPENDENT for 150 feet with the least restrictive device within 7 treatment day(s). ________________________________________________________________________________________________       PHYSICAL THERAPY: Initial Assessment and AM 6/15/2020  INPATIENT: PT Visit Days : 1  Payor: Lazarus Query / Plan: Vicky Chery O MEDICARE ADVANTAGE / Product Type: Managed Care Medicare /       NAME/AGE/GENDER: Stephen Davis is a 80 y.o. male   PRIMARY DIAGNOSIS: Sepsis ([facility] [A41.9] Sepsis ([facility] Sepsis ([facility]        ICD-10: Treatment Diagnosis:    · Other abnormalities of gait and mobility (R26.89)   Precaution/Allergies:  Patient has no known allergies. ASSESSMENT:     Mr. Tatiana Chapman presents in good spirits as a CV 19 R/O. All transfers are modified independent assistance x 1 out of bed to sit and stand then ambulation for 50 feet in the room with overall good balance. Therapeutic activities to assure patient safety. He is safe and stable in room upon conclusion of PT evaluation and treatment. Additional skilled PT is indicated for this patient's functional mobility deficits. He should not need additional PT upon discharge to home, where he is very active in his garden. He \"likes to stay busy. \"               This section established at most recent assessment   PROBLEM LIST (Impairments causing functional limitations):  1. Decreased Strength  2. Decreased ADL/Functional Activities  3. Decreased Transfer Abilities  4.  Decreased Ambulation Ability/Technique  5. Decreased Balance   INTERVENTIONS PLANNED: (Benefits and precautions of physical therapy have been discussed with the patient.)  1. Balance Exercise  2. Bed Mobility  3. Therapeutic Activites  4. Therapeutic Exercise/Strengthening     TREATMENT PLAN: Frequency/Duration: 4 times a week for duration of hospital stay  Rehabilitation Potential For Stated Goals: Good     REHAB RECOMMENDATIONS (at time of discharge pending progress):    Placement: It is my opinion, based on this patient's performance to date, that Mr. Jonnie Gonzalez may benefit from being discharged with NO further skilled therapy due to the high likelihood of returning to baseline. Equipment:    None at this time              HISTORY:   History of Present Injury/Illness (Reason for Referral):  PER MD H&P   Chief complaint fever weakness cough and shortness of breath     Patient is 55-year-old male with past medical history significant for hypertension, Asthma, gastric cancer status post resection, chronic kidney disease stage III, GERD presented to the ER because of fever weakness and shortness of breath. Patient stated that for the last 2 days he has been having fever and chills. Unsure of his temperature , but it was 103 -104 F. He has cough productive with white-colored phlegm. Shortness of breath at rest worse with activity. No orthopnea no paroxysmal nocturnal dyspnea. No chest pain no palpitations. No dysuria urgency or frequency of urination. He reported nonbilious, nonbloody vomiting and loose stools. No abdominal pain. He also noticed generalized weakness with muscle aches. No tingling numbness or focal weakness. 10 systems reviewed and negative except as noted in HPI. ER course  Fever with T-max of 103.34 night. Patient is not tachycardic, blood pressure 170/86 and room air oxygen saturations of 88 to 92% currently on 2 - 3 L oxygen saturation 98%.       CBC with white count of 7.3 with lymphopenia     CMP with sodium of 140 potassium 4.4 bicarbonate of 21 BUN 26 creatinine 2.05,      ALT 27 AST 63, lactic acid of 2.7, procalcitonin 0.22,     Chest x-ray on admission negative for acute cardiopulmonary abnormality. Urine analysis ordered. Blood cultures ordered. Fluids per sepsis protocol 30 mL/kg normal saline ordered. A dose of ceftriaxone and azithromycin ordered. Patient admitted for further evaluation management of suspected COVID-19 infection. Past Medical History/Comorbidities:   Mr. Damon Altman  has a past medical history of Asthma (2007), BPH (benign prostatic hypertrophy), [facility] Chronic kidney disease (2004), COPD (chronic obstructive pulmonary disease) ([facility] (7/28/2011), GERD (gastroesophageal reflux disease), Gout, Hemorrhoids, internal, History of diverticulosis, Hypercholesteremia, Hypertension, Muscle spasm, OA (osteoarthritis), REZA (obstructive sleep apnea) (7/28/2011), Osteoporosis, Personal history of colonic polyps, Prediabetes, and Wears dentures. Mr. Damon Altman  has a past surgical history that includes biopsy prostate (2008); pr sinus surgery proc unlisted (2005); hx prostatectomy (2002); hx gastrectomy (2004); and hx knee replacement (Bilateral). Social History/Living Environment:   Home Environment: Trailer/mobile home  # Steps to Enter: 8  Rails to Enter: Yes  [employer] : Bilateral  One/Two Story Residence: One story  Living Alone: Yes  Support Systems: Family member(s)  Patient Expects to be Discharged to[de-identified] Trailer/mobile home  Current DME Used/Available at Home: None  Prior Level of Function/Work/Activity: This kind patient had been independent with all prior functional mobility. Dominant Side:         RIGHT  Personal Factors:          Sex:  male        Age:  80 y.o.    Number of Personal Factors/Comorbidities that affect the Plan of Care: 0: LOW COMPLEXITY   EXAMINATION:   Most Recent Physical Functioning:   Gross Assessment:  AROM: Generally decreased, functional  PROM: Generally decreased, functional  Strength: Generally decreased, functional  Coordination: Generally decreased, functional  Tone: Normal  Sensation: Intact               Posture:     Balance:  Sitting: Intact  Standing: Intact Bed Mobility:  Rolling: Modified independent  Supine to Sit: Modified independent  Sit to Supine: Modified independent  Scooting: Modified independent  Wheelchair Mobility:     Transfers:  Sit to Stand: Modified independent  Stand to Sit: Modified independent  Bed to Chair: Modified independent  Gait:     Base of Support: Center of gravity altered  Speed/Joyce: Fluctuations; Pace decreased (<100 feet/min); Shuffled; Slow  Step Length: Left shortened;Right shortened  Distance (ft): 50 Feet (ft)  Assistive Device: (no assistive device at this time. )      Body Structures Involved:  1. Bones  2. Joints  3. Muscles  4. Ligaments Body Functions Affected:  1. Neuromusculoskeletal  2. Movement Related  3. Skin Related  4. Metobolic/Endocrine Activities and Participation Affected:  1. General Tasks and Demands  2. Communication  3. Mobility  4. Self Care  5. Domestic Life  6. Community, Social and [place]   Number of elements that affect the Plan of Care: 1-2: LOW COMPLEXITY   CLINICAL PRESENTATION:   Presentation: Stable and uncomplicated: LOW COMPLEXITY   CLINICAL DECISION MAKIN [facility] Mobility Inpatient Short Form  How much difficulty does the patient currently have. .. Unable A Lot A Little None   1. Turning over in bed (including adjusting bedclothes, sheets and blankets)? [] 1   [] 2   [] 3   [x] 4   2. Sitting down on and standing up from a chair with arms ( e.g., wheelchair, bedside commode, etc.)   [] 1   [] 2   [] 3   [x] 4   3. Moving from lying on back to sitting on the side of the bed? [] 1   [] 2   [] 3   [x] 4   How much help from another person does the patient currently need. .. Total A Lot A Little None   4.   Moving to and from a bed to a chair (including a wheelchair)? [] 1   [] 2   [] 3   [x] 4   5. Need to walk in hospital room? [] 1   [] 2   [] 3   [x] 4   6. Climbing 3-5 steps with a railing? [] 1   [] 2   [x] 3   [] 4   © 2007, Trustees of [address] [place] [address], under license to [employer]. All rights reserved      Score:  Initial: 23 Most Recent: X (Date: -- )    Interpretation of Tool:  Represents activities that are increasingly more difficult (i.e. Bed mobility, Transfers, Gait). Medical Necessity:     · Patient demonstrates good rehab potential due to higher previous functional level. Reason for Services/Other Comments:  · Patient continues to require skilled intervention due to mild debility and decreased mobility. \.   Use of outcome tool(s) and clinical judgement create a POC that gives a: Clear prediction of patient's progress: LOW COMPLEXITY            TREATMENT:   (In addition to Assessment/Re-Assessment sessions the following treatments were rendered)   Pre-treatment Symptoms/Complaints:  0/10  Pain: Initial:   Pain Intensity 1: 0  Post Session:  0/10    Evaluation and   therapeutic activity: (    23 mins): Therapeutic activities including Bed transfers, Chair transfers and Ambulation on level ground to improve mobility, strength, balance and coordination. Required minimal   to promote coordination of bilateral, lower extremity(s). Braces/Orthotics/Lines/Etc:   · O2 Device: Room air  Treatment/Session Assessment:    · Response to Treatment:  Improved transfers, ambulation and mobility. · Interdisciplinary Collaboration:   o Physical Therapist  · After treatment position/precautions:   o Up in chair  o Bed alarm/tab alert on  o Bed/Chair-wheels locked  o Call light within reach  o Side rails x 3   · Compliance with Program/Exercises: Will assess as treatment progresses  · Recommendations/Intent for next treatment session:   \"Next visit will focus on advancements to more challenging activities, reduction in assistance provided and increased transfers, ambulation and mobility. \".   Total Treatment Duration:  PT Patient Time In/Time Out  Time In: 1142  Time Out: Jean-Claude 22, PT

## 2020-06-15 NOTE — PROGRESS NOTES
Patient's HR 41. Patient is asymptomatic at this time and patient reports that he is \"not sure\" if his HR is always low or not. Notified Dr. Alejandro De Paz at [phone] via [employer] of the above stated. Dr. Alejandro De Paz read message at [phone]. No new orders at this time.

## 2020-06-15 NOTE — PROGRESS NOTES
Spoke with Harshal Murdock, patient's daughter, after verifying security code and provided her with an update. All questions answered. Informed patient's daughter that she is welcome to call any time and staff will call with any changes in condition.

## 2020-06-15 NOTE — PROGRESS NOTES
Received a call from [facility] in the lab notifying of a critical d dimer of 1.33. Results read back for verification. Notified Dr. Swathi Boo at [phone] via [employer]. Dr. Swathi Boo read the message at [phone]. No new orders at this time.

## 2020-06-15 NOTE — PROGRESS NOTES
Hospitalist Note     Admit Date:  2020  3:06 PM   Name:  Mini Villanueva   Age:  80 y.o.  :  12/15/1933   MRN:  [id number]   PCP:  Raimundo Bartholomew MD  Treatment Team: Attending Provider: Lakia Arboleda MD; Physical Therapist: Bay Lee PT; Utilization Review: Katie Jay; Primary Nurse: Billie Garcia, RN; Primary Nurse: Tristen Parker RN    HPI/Subjective:   Chief complaint fever weakness cough and shortness of breath    Patient is 42-year-old male with past medical history significant for hypertension, Asthma, gastric cancer status post resection, chronic kidney disease stage III, GERD presented to the ER because of fever weakness and shortness of breath. Patient stated that for the last 2 days he has been having fever and chills. Unsure of his temperature , but it was 103 -104 F. He has cough productive with white-colored phlegm. Shortness of breath at rest worse with activity. No orthopnea no paroxysmal nocturnal dyspnea. No chest pain no palpitations. No dysuria urgency or frequency of urination. He reported nonbilious, nonbloody vomiting and loose stools. No abdominal pain. He also noticed generalized weakness with muscle aches. No tingling numbness or focal weakness. 10 systems reviewed and negative except as noted in HPI. ER course  Fever with T-max of 103.34 night. Patient is not tachycardic, blood pressure 170/86 and room air oxygen saturations of 88 to 92% currently on 2 - 3 L oxygen saturation 98%. CBC with white count of 7.3 with lymphopenia    CMP with sodium of 140 potassium 4.4 bicarbonate of 21 BUN 26 creatinine 2.05,     ALT 27 AST 63, lactic acid of 2.7, procalcitonin 0.22,    Chest x-ray on admission negative for acute cardiopulmonary abnormality. Urine analysis ordered. Blood cultures ordered. Fluids per sepsis protocol 30 mL/kg normal saline ordered. A dose of ceftriaxone and azithromycin ordered.     Patient admitted for further evaluation management of Sepsis, suspected COVID-19 infection. 06/15 blood cultures positive for E. Coli. Fever with T-max of 103.3F on admission. Patient reports feeling much better. No nausea no vomiting no abdominal pain. He reports foul-smelling urine. No chest pain no shortness of breath no palpitations. Past Medical History:   Diagnosis Date    Asthma 2007    last exacerbation 5 yrs ago; very well controlled.  BPH (benign prostatic hypertrophy)     Cancer (HCC)     stomach(2004);  prostate cancer (2008)    Chronic kidney disease 2004    followed by [facility] (Dr. Ezequiel Moulton)    COPD (chronic obstructive pulmonary disease) ([facility] [address] 7/28/2011    GERD (gastroesophageal reflux disease)     controlled w/pepcid    Gout     Hemorrhoids, internal     History of diverticulosis     Hypercholesteremia     Hypertension     controlled w/med. Last echo (9/14/10):EF 55-60%, normal LVSF. Stress test (9/14/10): EF 56%,mild inferoseptal scar.     Muscle spasm     OA (osteoarthritis)     REZA (obstructive sleep apnea) 7/28/2011    no cpap    Osteoporosis     Personal history of colonic polyps     Prediabetes     Wears dentures       Past Surgical History:   Procedure Laterality Date    BIOPSY PROSTATE  2008    treated w/injections every 6months by Dr. Valentino Dawson HX GASTRECTOMY  2004    RESECTION FOR CANCER    HX KNEE REPLACEMENT Bilateral     HX PROSTATECTOMY  2002    SUPRAPUBIC    SINUS SURGERY [address] UNLISTED  2005    MUCORMYCOSIS      No Known Allergies   Social History     Tobacco Use    Smoking status: Never Smoker    Smokeless tobacco: Never Used    Tobacco comment: cigars   Substance Use Topics    Alcohol use: No      Family History   Problem Relation Age of Onset    Hypertension Mother     No Known Problems Father         pt does not know him    Hypertension Sister       Immunization History   Administered Date(s) Administered    (RETIRED) Pneumococcal Vaccine (Unspecified Type) 2010    Influenza High Dose Vaccine PF 10/02/2019, 2020    Influenza Vaccine 2014, 2018    Influenza Vaccine (Tri) Adjuvanted 10/04/2018    Pneumococcal Conjugate (PCV-13) 2017, 2020    Pneumococcal Polysaccharide (PPSV-23) 2013    Pneumococcal Vaccine (Unspecified Type) 2013    Tdap 2008    Zoster Recombinant 2019    Zoster Vaccine, Live 2014     PTA Medications:  Prior to Admission Medications   Prescriptions Last Dose Informant Patient Reported? Taking? QUEtiapine (SEROQUEL) 25 mg tablet   No No   Sig: Take 1 Tab by mouth nightly. albuterol (ProAir HFA) 90 mcg/actuation inhaler   No No   Sig: Take 1 Puff by inhalation every four (4) hours as needed for Shortness of Breath for up to 90 days. Ventolin inhaler   amLODIPine (NORVASC) 10 mg tablet   No No   Sig: TAKE 1 TAB BY MOUTH DAILY FOR HIGH BLOOD PRESSURE   calcitRIOL (ROCALTROL) 0.5 mcg capsule   Yes No   cholecalciferol, vitamin D3, (VITAMIN D3) 2,000 unit tab   Yes No   Sig: Take  by mouth. Indications: OTC   cyanocobalamin (VITAMIN B12) 1,000 mcg/mL injection   Yes No   Si,000 mcg by IntraMUSCular route once. famotidine (PEPCID) 20 mg tablet   Yes No   fexofenadine (Allegra Allergy) 180 mg tablet   No No   Sig: Take 1 Tab by mouth daily. fluticasone furoate-vilanteroL (Breo Ellipta) 100-25 mcg/dose inhaler   No No   Sig: INHALE ONE PUFF BY MOUTH ONCE DAILY. fluticasone propionate (Flonase Allergy Relief) 50 mcg/actuation nasal spray   No No   Si sprays in each nostril once a day   metoprolol succinate (TOPROL-XL) 50 mg XL tablet   No No   Sig: TAKE ONE TABLET BY MOUTH ONCE DAILY. sertraline (ZOLOFT) 100 mg tablet   No No   Sig: Take 1.5 Tabs by mouth daily. sodium bicarbonate 650 mg tablet   Yes No   Sig: TAKE 2 TABLETS BY MOUTH 3 TIMES A DAY   tadalafil (CIALIS) 20 mg tablet   Yes No   Sig: Take 20 mg by mouth as needed.    tiZANidine (ZANAFLEX) 4 mg tablet   No No   Sig: Take 1 Tab by mouth three (3) times daily as needed (back pain). traZODone (DESYREL) 50 mg tablet   No No   Sig: Take 1-2 Tabs by mouth nightly. Facility-Administered Medications: None       Objective:     Patient Vitals for the past 24 hrs:   Temp Pulse Resp BP SpO2   06/15/20 1214     98 %   06/15/20 1133 98.2 °F (36.8 °C) (!) 47 19 110/54 96 %   06/15/20 0930     99 %   06/15/20 0737 98.3 °F (36.8 °C) (!) 58 19 130/53 97 %   06/15/20 0323 98.3 °F (36.8 °C) (!) 41 18 134/62 98 %   06/14/20 2302     98 %   06/14/20 2229 98.8 °F (37.1 °C) (!) 49 18 107/61 100 %   06/14/20 1915 99.5 °F (37.5 °C) (!) 52 18 121/61 94 %   06/14/20 1814  (!) 57  111/58 98 %   06/14/20 1716 (!) 100.9 °F (38.3 °C)       06/14/20 1714  (!) 58  127/63 97 %   06/14/20 1700  (!) 59  153/69 99 %   06/14/20 1629  61  149/65 92 %   06/14/20 1614  60  152/70 99 %   06/14/20 1544  66  126/61 97 %   06/14/20 1510 (!) 103.3 °F (39.6 °C) 71 18 170/86 98 %     Oxygen Therapy  O2 Sat (%): 98 % (06/15/20 1214)  Pulse via Oximetry: 47 beats per minute (06/15/20 1214)  O2 Device: Room air (06/15/20 1214)  O2 Flow Rate (L/min): 2 l/min(weaned to ra) (06/15/20 0930)      Intake/Output Summary (Last 24 hours) at 6/15/2020 1405  Last data filed at 6/15/2020 0029  Gross per 24 hour   Intake 1420 ml   Output 240 ml   Net 1180 ml       *Note that automatically entered I/Os may not be accurate; dependent on patient compliance with collection and accurate  by assistants. Physical Exam:  General:    Elderly, patient unable to speak in full sentences, increased work of breathing, tachypneic, currently on 2 to 3 L oxygen by nasal cannula,  Eyes:   Normal sclerae. Extraocular movements intact. HENT:  Normocephalic, atraumatic. Moist mucous membranes  CV:   Bradycardic, regular rhtyhm. No m/r/g.     Lungs:  Clear to auscultation bilateral, no rhonchi rales wheeze,  Abdomen: Soft, nontender, nondistended. Active bowel sounds, no organomegaly, no suprapubic tenderness. Extremities: Warm and dry. No cyanosis or edema. Neurologic: CN II-XII grossly intact. Sensation intact. Skin:     No rashes or jaundice. Normal coloration  Psych:  Normal mood and affect. I reviewed the labs, imaging, EKGs, telemetry, and other studies done this admission. Data Review:   Recent Results (from the past 24 hour(s))   CBC WITH AUTOMATED DIFF    Collection Time: 06/14/20  3:25 PM   Result Value Ref Range    WBC 7.3 4.3 - 11.1 K/uL    RBC 5.40 4.23 - 5.6 M/uL    HGB 14.5 13.6 - 17.2 g/dL    HCT 45.9 41.1 - 50.3 %    MCV 85.0 79.6 - 97.8 FL    MCH 26.9 26.1 - 32.9 PG    MCHC 31.6 31.4 - 35.0 g/dL    RDW 15.1 (H) 11.9 - 14.6 %    PLATELET 142 783 - 878 K/uL    MPV 10.7 9.4 - 12.3 FL    ABSOLUTE NRBC 0.00 0.0 - 0.2 K/uL    DF AUTOMATED      NEUTROPHILS 88 (H) 43 - 78 %    LYMPHOCYTES 5 (L) 13 - 44 %    MONOCYTES 1 (L) 4.0 - 12.0 %    EOSINOPHILS 5 0.5 - 7.8 %    BASOPHILS 1 0.0 - 2.0 %    IMMATURE GRANULOCYTES 0 0.0 - 5.0 %    ABS. NEUTROPHILS 6.4 1.7 - 8.2 K/UL    ABS. LYMPHOCYTES 0.4 (L) 0.5 - 4.6 K/UL    ABS. MONOCYTES 0.1 0.1 - 1.3 K/UL    ABS. EOSINOPHILS 0.4 0.0 - 0.8 K/UL    ABS. BASOPHILS 0.0 0.0 - 0.2 K/UL    ABS. IMM. GRANS. 0.0 0.0 - 0.5 K/UL   METABOLIC PANEL, COMPREHENSIVE    Collection Time: 06/14/20  3:25 PM   Result Value Ref Range    Sodium 140 136 - 145 mmol/L    Potassium 4.4 3.5 - 5.1 mmol/L    Chloride 109 (H) 98 - 107 mmol/L    CO2 21 21 - 32 mmol/L    Anion gap 10 7 - 16 mmol/L    Glucose 100 65 - 100 mg/dL    BUN 26 (H) 8 - 23 MG/DL    Creatinine 2.05 (H) 0.8 - 1.5 MG/DL    GFR est AA 40 (L) >60 ml/min/1.73m2    GFR est non-AA 33 (L) >60 ml/min/1.73m2    Calcium 8.8 8.3 - 10.4 MG/DL    Bilirubin, total 0.6 0.2 - 1.1 MG/DL    ALT (SGPT) 27 12 - 65 U/L    AST (SGOT) 63 (H) 15 - 37 U/L    Alk.  phosphatase 153 (H) 50 - 136 U/L    Protein, total 8.0 6.3 - 8.2 g/dL    Albumin 3.0 (L) 3.2 - 4.6 g/dL    Globulin 5.0 (H) 2.3 - 3.5 g/dL    A-G Ratio 0.6 (L) 1.2 - 3.5     LACTIC ACID    Collection Time: 06/14/20  3:25 PM   Result Value Ref Range    Lactic acid 2.7 (HH) 0.4 - 2.0 MMOL/L   TROPONIN-HIGH SENSITIVITY    Collection Time: 06/14/20  3:25 PM   Result Value Ref Range    Troponin-High Sensitivity 9.8 0 - 14 pg/mL   CULTURE, BLOOD    Collection Time: 06/14/20  3:26 PM   Result Value Ref Range    Special Requests: RIGHT  Antecubital        GRAM STAIN GRAM NEGATIVE RODS      GRAM STAIN AEROBIC BOTTLE POSITIVE      GRAM STAIN        RESULTS VERIFIED, PHONED TO AND READ BACK BY IRAM CLEMENS RN @ 6981 ON 6/15/2020 AK. Culture result: CULTURE IN PROGRESS,FURTHER UPDATES TO FOLLOW      Culture result: REFER TO [facility] PANEL ACC [id number]    BLOOD CULTURE ID PANEL    Collection Time: 06/14/20  3:26 PM   Result Value Ref Range    Acc. no. from Micro Order [id number]     Escherichia coli Detected (A) NOTDET      KPC (Carbapenem Resistance Gene) NOT DETECTED NOTDET      INTERPRETATION        Gram negative onelia.  Identified by realtime PCR as E. coli   PROCALCITONIN    Collection Time: 06/14/20  3:27 PM   Result Value Ref Range    Procalcitonin 0.22 ng/mL   NT-PRO BNP    Collection Time: 06/14/20  3:27 PM   Result Value Ref Range    NT pro-BNP 1,143 (H) <450 PG/ML   LIPASE    Collection Time: 06/14/20  3:28 PM   Result Value Ref Range    Lipase 74 73 - 393 U/L   FERRITIN    Collection Time: 06/14/20  3:28 PM   Result Value Ref Range    Ferritin 55 8 - 388 NG/ML   LD    Collection Time: 06/14/20  3:28 PM   Result Value Ref Range     (H) 110 - 210 U/L   C REACTIVE PROTEIN, QT    Collection Time: 06/14/20  3:28 PM   Result Value Ref Range    C-Reactive protein 0.9 0.0 - 0.9 mg/dL   D DIMER    Collection Time: 06/14/20  3:28 PM   Result Value Ref Range    D DIMER 1.85 (HH) <0.56 ug/ml(FEU)   CULTURE, BLOOD    Collection Time: 06/14/20  3:30 PM   Result Value Ref Range    Special Requests: LEFT  Antecubital GRAM STAIN GRAM NEGATIVE RODS      GRAM STAIN AEROBIC BOTTLE POSITIVE      GRAM STAIN        RESULTS VERIFIED, PHONED TO AND READ BACK BY IRAM CLEMENS RN @ 3233 ON 6/15/2020 AK. Culture result: CULTURE IN PROGRESS,FURTHER UPDATES TO FOLLOW     MAGNESIUM    Collection Time: 06/14/20  3:30 PM   Result Value Ref Range    Magnesium 2.0 1.8 - 2.4 mg/dL   URINALYSIS W/ RFLX MICROSCOPIC    Collection Time: 06/14/20  5:20 PM   Result Value Ref Range    Color YELLOW      Appearance CLEAR      Specific gravity 1.008 1.001 - 1.023      pH (UA) 6.0 5.0 - 9.0      Protein Negative NEG mg/dL    Glucose Negative NEG mg/dL    Ketone Negative NEG mg/dL    Bilirubin Negative NEG      Blood TRACE (A) NEG      Urobilinogen 1.0 0.2 - 1.0 EU/dL    Nitrites Negative NEG      Leukocyte Esterase Negative NEG      Bacteria 0 0 /hpf   URINE MICROSCOPIC    Collection Time: 06/14/20  5:20 PM   Result Value Ref Range    WBC 0-3 0 /hpf    RBC 0-3 0 /hpf    Epithelial cells 0-3 0 /hpf    Bacteria PENDING /hpf    Casts 0 0 /lpf    Crystals, urine 0 0 /LPF    Mucus 0 0 /lpf   LACTIC ACID    Collection Time: 06/14/20  9:02 PM   Result Value Ref Range    Lactic acid 3.5 (HH) 0.4 - 2.0 MMOL/L   METABOLIC PANEL, COMPREHENSIVE    Collection Time: 06/15/20  4:35 AM   Result Value Ref Range    Sodium 140 136 - 145 mmol/L    Potassium 4.4 3.5 - 5.1 mmol/L    Chloride 110 (H) 98 - 107 mmol/L    CO2 22 21 - 32 mmol/L    Anion gap 8 7 - 16 mmol/L    Glucose 92 65 - 100 mg/dL    BUN 26 (H) 8 - 23 MG/DL    Creatinine 2.17 (H) 0.8 - 1.5 MG/DL    GFR est AA 37 (L) >60 ml/min/1.73m2    GFR est non-AA 31 (L) >60 ml/min/1.73m2    Calcium 7.7 (L) 8.3 - 10.4 MG/DL    Bilirubin, total 0.4 0.2 - 1.1 MG/DL    ALT (SGPT) 24 12 - 65 U/L    AST (SGOT) 43 (H) 15 - 37 U/L    Alk.  phosphatase 125 50 - 136 U/L    Protein, total 6.5 6.3 - 8.2 g/dL    Albumin 2.5 (L) 3.2 - 4.6 g/dL    Globulin 4.0 (H) 2.3 - 3.5 g/dL    A-G Ratio 0.6 (L) 1.2 - 3.5     PROTHROMBIN TIME + INR Collection Time: 06/15/20  4:35 AM   Result Value Ref Range    Prothrombin time 15.2 (H) 12.0 - 14.7 sec    INR 1.2     PTT    Collection Time: 06/15/20  4:35 AM   Result Value Ref Range    aPTT 56.3 (H) 24.3 - 35.4 SEC   FIBRINOGEN    Collection Time: 06/15/20  4:35 AM   Result Value Ref Range    Fibrinogen 494 190 - 501 mg/dL   LD    Collection Time: 06/15/20  4:35 AM   Result Value Ref Range     110 - 210 U/L   FERRITIN    Collection Time: 06/15/20  4:35 AM   Result Value Ref Range    Ferritin 67 8 - 388 NG/ML   D DIMER    Collection Time: 06/15/20  4:35 AM   Result Value Ref Range    D DIMER 1.33 (HH) <0.56 ug/ml(FEU)   LACTIC ACID    Collection Time: 06/15/20  4:35 AM   Result Value Ref Range    Lactic acid 1.0 0.4 - 2.0 MMOL/L   CBC WITH AUTOMATED DIFF    Collection Time: 06/15/20  7:30 AM   Result Value Ref Range    WBC 12.4 (H) 4.3 - 11.1 K/uL    RBC 3.87 (L) 4.23 - 5.6 M/uL    HGB 10.8 (L) 13.6 - 17.2 g/dL    HCT 31.6 (L) 41.1 - 50.3 %    MCV 81.7 79.6 - 97.8 FL    MCH 27.9 26.1 - 32.9 PG    MCHC 34.2 31.4 - 35.0 g/dL    RDW 15.1 (H) 11.9 - 14.6 %    PLATELET 126 171 - 165 K/uL    MPV 10.8 9.4 - 12.3 FL    ABSOLUTE NRBC 0.00 0.0 - 0.2 K/uL    DF AUTOMATED      NEUTROPHILS 71 43 - 78 %    LYMPHOCYTES 14 13 - 44 %    MONOCYTES 10 4.0 - 12.0 %    EOSINOPHILS 5 0.5 - 7.8 %    BASOPHILS 0 0.0 - 2.0 %    IMMATURE GRANULOCYTES 0 0.0 - 5.0 %    ABS. NEUTROPHILS 8.9 (H) 1.7 - 8.2 K/UL    ABS. LYMPHOCYTES 1.7 0.5 - 4.6 K/UL    ABS. MONOCYTES 1.2 0.1 - 1.3 K/UL    ABS. EOSINOPHILS 0.6 0.0 - 0.8 K/UL    ABS. BASOPHILS 0.0 0.0 - 0.2 K/UL    ABS. IMM.  GRANS. 0.1 0.0 - 0.5 K/UL       All Micro Results     Procedure Component Value Units Date/Time    CULTURE, URINE [[id number]] Collected:  06/15/20 1209    Order Status:  Completed Specimen:  Urine from Clean catch Updated:  06/15/20 1249    EMERGENT DISEASE PANEL [[id number]] Collected:  06/14/20 1646    Order Status:  Completed Updated:  06/15/20 1144    BLOOD CULTURE ID PANEL [[id number]]  (Abnormal) Collected:  06/14/20 1526    Order Status:  Completed Specimen:  Blood Updated:  06/15/20 0900     Acc. no. from Micro Order [id number]     Escherichia coli Detected        Comment: RESULTS VERIFIED, PHONED TO AND READ BACK BY  IRAM CLEMENS RN @ 0845 ON 6/15/2020 AK. KPC (Carbapenem Resistance Gene) NOT DETECTED        Comment: WARNING:  A Not Detected result for the KPC gene does not indicate susceptibility to carbapenems. Gram negative bacteria can be resistant to carbapenems by mechanisms other than carrying the KPC gene. INTERPRETATION       Gram negative onelia. Identified by realtime PCR as E. coli          CULTURE, BLOOD [[id number]] Collected:  06/14/20 1530    Order Status:  Completed Specimen:  Blood Updated:  06/15/20 0856     Special Requests: --        LEFT  Antecubital       GRAM STAIN GRAM NEGATIVE RODS         AEROBIC BOTTLE POSITIVE               RESULTS VERIFIED, PHONED TO AND READ BACK BY IRAM CLEMENS RN @ 0845 ON 6/15/2020 AK. Culture result:       CULTURE IN PROGRESS,FURTHER UPDATES TO FOLLOW          CULTURE, BLOOD [[id number]] Collected:  06/14/20 1526    Order Status:  Completed Specimen:  Blood Updated:  06/15/20 0855     Special Requests: --        RIGHT  Antecubital       GRAM STAIN GRAM NEGATIVE RODS         AEROBIC BOTTLE POSITIVE               RESULTS VERIFIED, PHONED TO AND READ BACK BY IRAM CLEMENS RN @ 0845 ON 6/15/2020 AK.            Culture result:       CULTURE IN PROGRESS,FURTHER UPDATES TO FOLLOW            REFER TO BIOFIRE PANEL ACC [id number]          Current Facility-Administered Medications   Medication Dose Route Frequency    [START ON 6/16/2020] loratadine (CLARITIN) tablet 10 mg  10 mg Oral DAILY    [START ON 6/16/2020] calcitRIOL (ROCALTROL) capsule 0.5 mcg  0.5 mcg Oral DAILY    sodium bicarbonate tablet 650 mg  650 mg Oral TID    traZODone (DESYREL) tablet 50 mg  50 mg Oral QHS    acetaminophen (TYLENOL) tablet 650 mg  650 mg Oral Q6H PRN    Or    acetaminophen (TYLENOL) suppository 650 mg  650 mg Rectal Q6H PRN    [Held by provider] heparin (porcine) injection 5,000 Units  5,000 Units SubCUTAneous Q8H    sodium chloride (NS) flush 5-40 mL  5-40 mL IntraVENous Q8H    sodium chloride (NS) flush 5-40 mL  5-40 mL IntraVENous PRN    azithromycin (ZITHROMAX) 500 mg in 0.9% sodium chloride (MBP/ADV) 250 mL  500 mg IntraVENous Q24H    cefTRIAXone (ROCEPHIN) 2 g in 0.9% sodium chloride (MBP/ADV) 50 mL  2 g IntraVENous Q24H    albuterol (PROVENTIL VENTOLIN) nebulizer solution 2.5 mg  2.5 mg Nebulization Q4H PRN       Other Studies:  Xr Chest Port    Result Date: 6/14/2020  History: Fever, productive cough Exam: portable chest Comparison: 2/28/2019 Findings: No new alveolar infiltrate or pleural effusion. No change in the appearance of the mediastinal contour or osseous structures.  Impressions: Stable portable chest       Assessment and Plan:     Hospital Problems as of 6/15/2020 Date Reviewed: 7/31/2018          Codes Class Noted - Resolved POA    * (Principal) Sepsis ([facility] ICD-10-CM: A41.9  ICD-9-CM: 038.9, 995.91  6/14/2020 - Present Unknown        Suspected COVID-19 virus infection ICD-10-CM: Z20.828  ICD-9-CM: V01.79  6/14/2020 - Present Unknown        Acute hypoxemic respiratory failure ([facility] ICD-10-CM: J96.01  ICD-9-CM: 518.81  6/14/2020 - Present Unknown        Lactic acidosis ICD-10-CM: E87.2  ICD-9-CM: 276.2  6/14/2020 - Present Unknown        CKD (chronic kidney disease) stage 3, GFR 30-59 ml/min ([facility]) ICD-10-CM: N18.3  ICD-9-CM: 585.3  8/23/2019 - Present Yes        Gastroesophageal reflux disease without esophagitis ICD-10-CM: K21.9  ICD-9-CM: 530.81  4/20/2018 - Present Yes        Hypertensive CKD (chronic kidney disease) ICD-10-CM: I12.9  ICD-9-CM: 403.90  7/28/2011 - Present Yes        REZA (obstructive sleep apnea) ICD-10-CM: G66.57  ICD-9-CM: 327.23  7/28/2011 - Present Yes        COPD (chronic obstructive pulmonary disease) [facility]) ICD-10-CM: J44.9  ICD-9-CM: 691  7/28/2011 - Present Yes              Plan: This 24-year-old male with:     Sepsis POA secondary to unknown etiology- GI/ source. Patient meets criteria for sepsis, he is febrile, no tachycardia no leukocytosis, tachypneic, and has lactic acidosis, mildly elevated procalcitonin. Blood cultures done on admission positive for gram-negative rods, aerobic cultures - E. coli  Continue IV ceftriaxone. DC azithromycin. Repeat blood cultures in a.m. Will obtain CT of the abdomen and pelvis with oral contrast.   Urine analysis not suggestive of UTI. Will check urine culture. E. coli bacteremia  Blood cultures 2 x 2 on admission positive for E. coli. Repeat blood cultures in a.m. Will obtain CT of the abdomen and pelvis. May need echocardiogram ? once COVID test is negative. Suspected COVID-19 virus infection:  Wean oxygen as tolerated. Emergent disease panel pending. Chest x-ray negative. Proventil, ceftriaxone. DC Azithromycin. Will check LDH, ferritin, lactic acid, LDH, d-dimer. Acute hypoxemic respiratory failure resolved  Patient's oxygen saturations greater than 95% on room air. CXR negative. Elevated D dimer:  Likely from sepsis. Emergent disease panel pending. Well's score is 0 probability of PE is less likely. Suspected COVID-19 infection less likely  As above. Lactic acidosis POA  resolved    CKD stage III  Creatinine at baseline. COPD/asthma  Albuterol as needed    Sinus Bradycardia:  Hold metoprolol. Asymptomatic. Check TSH in a.m. No signs of bleeding. Expected drop in hemoglobin of 10.8 from 14.5 on admission. Patient is dry on exam on admission and received 2 L of IV fluids. Patient's baseline hemoglobin is approximately 11. DVT ppx: Heparin subcu    Code status:  DNR    DPOA: Patient's wife [phone]    Estimated LOS: Anticipate discharge home in 48 hours.     Risk:  high    Signed:  Chirag Bartholomew MD

## 2020-06-15 NOTE — PROGRESS NOTES
Problem: Patient Education: Go to Patient Education Activity  Goal: Patient/Family Education  Description: 1. Patient will complete functional transfers with independence and adaptive equipment as needed. - MET  2. Patient will complete functional mobility with MOD I and adaptive equipment as needed. - MET    Time Frame: 1 visit   Outcome: Resolved/Met      OCCUPATIONAL THERAPY: Initial Assessment, Daily Note, Discharge, and AM 6/15/2020  INPATIENT: OT Visit Days: 1  Payor: Sharlene Frames / Plan: Lianne Nicole O MEDICARE ADVANTAGE / Product Type: Managed Care Medicare /      NAME/AGE/GENDER: Chelsie Whatley is a 80 y.o. male   PRIMARY DIAGNOSIS:  Sepsis ([facility] [address] [A41.9] Sepsis ([facility] [address] Sepsis ([facility] [address]        ICD-10: Treatment Diagnosis:    Generalized Muscle Weakness (M62.81)   Precautions/Allergies:     Patient has no known allergies. ASSESSMENT:     Mr. Marino Louis presents for the above diagnoses. Upon arrival, pt supine in bed and agreeable to OT evaluation. Pt is alert and oriented x 4. Currently resting on RA. Pt reports living alone in a 1-story home with  steps to enter and access to bilateral hand rails. At baseline, pt was independent with ADLs, IADLs, and functional mobility with no DME use. Still drives. Likes to maintain his garden. Endorses no hx of falls. Prior to mobility, Sp02 sats at 97% prior to mobility. Pt completed functional transfers with independence. Static and dynamic sitting and standing balance are intact with no additional support required. Pt ambulated in room for ~75 ft with independence. Upon completion of mobility in room, Sp02 checked with sats at 94% and above. Pt placed sitting upright in bedside chair and left with needs met, call light within reach, and RN notified. At this time, Chelsie Whatley seems to be functioning at baseline for ADLs and functional mobility. Pt to be discharge from OT services.      This section established at most recent assessment   PROBLEM LIST (Impairments causing functional limitations):  Decreased Activity Tolerance   INTERVENTIONS PLANNED: (Benefits and precautions of occupational therapy have been discussed with the patient.)  discharge     TREATMENT PLAN: Frequency/Duration: discharge  Rehabilitation Potential For Stated Goals: discharge     REHAB RECOMMENDATIONS (at time of discharge pending progress):    Placement: It is my opinion, based on this patient's performance to date, that Mr. Norah Palacios may benefit from being discharged with NO further skilled therapy due to a proven ability to function at baseline. Equipment:   None at this time              [facility]:   History of Present Injury/Illness (Reason for Referral):  See H&P  Past Medical History/Comorbidities:   Mr. Norah Palacios  has a past medical history of Asthma (2007), BPH (benign prostatic hypertrophy), Cancer ([facility] [address] Chronic kidney disease (2004), COPD (chronic obstructive pulmonary disease) ([facility] [address] (7/28/2011), GERD (gastroesophageal reflux disease), Gout, Hemorrhoids, internal, History of diverticulosis, Hypercholesteremia, Hypertension, Muscle spasm, OA (osteoarthritis), REZA (obstructive sleep apnea) (7/28/2011), Osteoporosis, Personal history of colonic polyps, Prediabetes, and Wears dentures. Mr. Norah Palacios  has a past surgical history that includes biopsy prostate (2008); pr sinus surgery proc unlisted (2005); hx prostatectomy (2002); hx gastrectomy (2004); and hx knee replacement (Bilateral). Social History/Living Environment:   Home Environment: Trailer/mobile home  # Steps to Enter: 8  Rails to Enter: Yes  Hand Rails : Bilateral  One/Two Story Residence: One story  Living Alone: Yes  Support Systems: Family member(s)  Patient Expects to be Discharged to[de-identified] Trailer/mobile home  Current DME Used/Available at Home: None  Prior Level of Function/Work/Activity:  Independent with ADLs and functional mobility; lives alone; still driving. Personal Factors:          Sex:  male        Age:  80 y.o. Other factors that influence how disability is experienced by the patient:  multiple co-morbidities    Number of Personal Factors/Comorbidities that affect the Plan of Care: Brief history (0):  LOW COMPLEXITY   ASSESSMENT OF OCCUPATIONAL PERFORMANCE[de-identified]   Activities of Daily Living:   Basic ADLs (From Assessment) Complex ADLs (From Assessment)   Feeding: Independent  Oral Facial Hygiene/Grooming: Independent  Bathing: Independent  Upper Body Dressing: Independent  Lower Body Dressing: Independent  Toileting: Independent Instrumental ADL  Meal Preparation: Independent  Homemaking: Independent   Grooming/Bathing/Dressing Activities of Daily Living     Cognitive Retraining  Safety/Judgement: Awareness of environment                       Bed/Mat Mobility  Rolling: Independent  Supine to Sit: Independent  Sit to Supine: Independent  Sit to Stand: Independent  Stand to Sit: Independent  Scooting: Independent     Most Recent Physical Functioning:   Gross Assessment:  AROM: Generally decreased, functional  PROM: Generally decreased, functional  Strength: Generally decreased, functional  Coordination: Generally decreased, functional  Tone: Normal  Sensation: Intact               Posture:     Balance:  Sitting: Intact  Standing: Intact Bed Mobility:  Rolling: Independent  Supine to Sit: Independent  Sit to Supine: Independent  Scooting: Independent  Wheelchair Mobility:     Transfers:  Sit to Stand: Independent  Stand to Sit: Independent            Patient Vitals for the past 6 hrs:   BP BP Patient Position SpO2 O2 Flow Rate (L/min) Pulse   06/15/20 0536 -- -- -- 2 l/min --   06/15/20 0636 -- -- -- 2 l/min --   06/15/20 0737 130/53 At rest 97 % -- (!) 58   06/15/20 0930 -- -- 99 % 2 l/min --       Mental Status  Neurologic State: Alert  Orientation Level: Oriented X4  Cognition: Appropriate decision making  Perception: Appears intact  Perseveration: No perseveration noted  Safety/Judgement: Awareness of environment Physical Skills Involved:  Balance  Strength  Activity Tolerance  Gross Motor Control Cognitive Skills Affected (resulting in the inability to perform in a timely and safe manner):  none  Psychosocial Skills Affected:  Habits/Routines  Environmental Adaptation   Number of elements that affect the Plan of Care: 5+:  HIGH COMPLEXITY   CLINICAL DECISION MAKIN[address] [place] [address] AM-PAC 6 Clicks   Daily Activity Inpatient Short Form  How much help from another person does the patient currently need. .. Total A Lot A Little None   1. Putting on and taking off regular lower body clothing? [] 1   [] 2   [] 3   [x] 4   2. Bathing (including washing, rinsing, drying)? [] 1   [] 2   [] 3   [x] 4   3. Toileting, which includes using toilet, bedpan or urinal?   [] 1   [] 2   [] 3   [x] 4   4. Putting on and taking off regular upper body clothing? [] 1   [] 2   [] 3   [x] 4   5. Taking care of personal grooming such as brushing teeth? [] 1   [] 2   [] 3   [x] 4   6. Eating meals? [] 1   [] 2   [] 3   [x] 4   © , Trustees of [address] [place] [address], under license to [employer]. All rights reserved      Score:  Initial: 24 Most Recent: X (Date: -- )    Interpretation of Tool:  Represents activities that are increasingly more difficult (i.e. Bed mobility, Transfers, Gait). Medical Necessity:     discharge. Reason for Services/Other Comments:  discharge   Use of outcome tool(s) and clinical judgement create a POC that gives a: LOW COMPLEXITY         TREATMENT:   (In addition to Assessment/Re-Assessment sessions the following treatments were rendered)     Pre-treatment Symptoms/Complaints:    Pain: Initial:   Pain Intensity 1: 0  Post Session:  same     Therapeutic Activity: (    10 minutes): Therapeutic activities including Bed transfers, Chair transfers, and Ambulation on level ground to improve mobility, strength, balance, and coordination.   Required minimal   to promote static and dynamic balance in standing. Braces/Orthotics/Lines/Etc:   O2 Device: Nasal cannula(weaned to RA)  Treatment/Session Assessment:    Response to Treatment:  tolerated well with no issues noted. Interdisciplinary Collaboration:   Occupational Therapist  Registered Nurse  After treatment position/precautions:   Up in chair  Bed/Chair-wheels locked  Call light within reach  RN notified   Compliance with Program/Exercises: discharge.   Recommendations/Intent for next treatment session:  discharge  Total Treatment Duration:  OT Patient Time In/Time Out  Time In: 1033  Time Out: 1527 OKSANA Cisneros

## 2020-06-15 NOTE — PROGRESS NOTES
Spoke with Neri Agarwal in the lab regarding her concern about patient's Hgb dropping from 14.5 on 6/14 to 11.7 on 6/15. This RN informed Neri Agarwal that the patient did receive a 1L bolus from me around 2200 but did not have any fluids infusing at the time the blood specimen was collected. Neri Agarwal and myself agreed that a recollect would be appropriate to ensure accuracy of results. Will recollect labs.

## 2020-06-15 NOTE — PROGRESS NOTES
Pt resting in bed watching tv. Pt alert and oriented times 3 at this time. Pt on 2L NC. No s/sx of acute distress. Denies pain. Encouraged to call for assistance as needed. Call light within reach. Will continue to monitor.

## 2020-06-16 LAB
ALBUMIN SERPL-MCNC: 2.4 G/DL (ref 3.2–4.6)
ALBUMIN/GLOB SERPL: 0.6 {RATIO} (ref 1.2–3.5)
ALP SERPL-CCNC: 116 U/L (ref 50–136)
ALT SERPL-CCNC: 19 U/L (ref 12–65)
ANION GAP SERPL CALC-SCNC: 7 MMOL/L (ref 7–16)
AST SERPL-CCNC: 25 U/L (ref 15–37)
BASOPHILS # BLD: 0 K/UL (ref 0–0.2)
BASOPHILS NFR BLD: 0 % (ref 0–2)
BILIRUB SERPL-MCNC: 0.2 MG/DL (ref 0.2–1.1)
BUN SERPL-MCNC: 25 MG/DL (ref 8–23)
CALCIUM SERPL-MCNC: 8.1 MG/DL (ref 8.3–10.4)
CHLORIDE SERPL-SCNC: 113 MMOL/L (ref 98–107)
CO2 SERPL-SCNC: 23 MMOL/L (ref 21–32)
CREAT SERPL-MCNC: 2.04 MG/DL (ref 0.8–1.5)
DIFFERENTIAL METHOD BLD: ABNORMAL
EOSINOPHIL # BLD: 0.7 K/UL (ref 0–0.8)
EOSINOPHIL NFR BLD: 12 % (ref 0.5–7.8)
ERYTHROCYTE [DISTWIDTH] IN BLOOD BY AUTOMATED COUNT: 15 % (ref 11.9–14.6)
GLOBULIN SER CALC-MCNC: 3.8 G/DL (ref 2.3–3.5)
GLUCOSE SERPL-MCNC: 97 MG/DL (ref 65–100)
HCT VFR BLD AUTO: 33.1 % (ref 41.1–50.3)
HGB BLD-MCNC: 10.8 G/DL (ref 13.6–17.2)
IMM GRANULOCYTES # BLD AUTO: 0 K/UL (ref 0–0.5)
IMM GRANULOCYTES NFR BLD AUTO: 0 % (ref 0–5)
LYMPHOCYTES # BLD: 1.2 K/UL (ref 0.5–4.6)
LYMPHOCYTES NFR BLD: 20 % (ref 13–44)
MCH RBC QN AUTO: 27.2 PG (ref 26.1–32.9)
MCHC RBC AUTO-ENTMCNC: 32.6 G/DL (ref 31.4–35)
MCV RBC AUTO: 83.4 FL (ref 79.6–97.8)
MONOCYTES # BLD: 0.7 K/UL (ref 0.1–1.3)
MONOCYTES NFR BLD: 13 % (ref 4–12)
NEUTS SEG # BLD: 3.1 K/UL (ref 1.7–8.2)
NEUTS SEG NFR BLD: 54 % (ref 43–78)
NRBC # BLD: 0 K/UL (ref 0–0.2)
PLATELET # BLD AUTO: 176 K/UL (ref 150–450)
PMV BLD AUTO: 11 FL (ref 9.4–12.3)
POTASSIUM SERPL-SCNC: 3.8 MMOL/L (ref 3.5–5.1)
PROT SERPL-MCNC: 6.2 G/DL (ref 6.3–8.2)
RBC # BLD AUTO: 3.97 M/UL (ref 4.23–5.6)
SODIUM SERPL-SCNC: 143 MMOL/L (ref 136–145)
TSH SERPL DL<=0.005 MIU/L-ACNC: 2.84 UIU/ML (ref 0.36–3.74)
WBC # BLD AUTO: 5.6 K/UL (ref 4.3–11.1)

## 2020-06-16 PROCEDURE — 74011250637 HC RX REV CODE- 250/637: Performed by: FAMILY MEDICINE

## 2020-06-16 PROCEDURE — 74011250637 HC RX REV CODE- 250/637: Performed by: HOSPITALIST

## 2020-06-16 PROCEDURE — 74011250637 HC RX REV CODE- 250/637: Performed by: INTERNAL MEDICINE

## 2020-06-16 PROCEDURE — 74011000258 HC RX REV CODE- 258: Performed by: HOSPITALIST

## 2020-06-16 PROCEDURE — 80053 COMPREHEN METABOLIC PANEL: CPT

## 2020-06-16 PROCEDURE — 84443 ASSAY THYROID STIM HORMONE: CPT

## 2020-06-16 PROCEDURE — 65270000029 HC RM PRIVATE

## 2020-06-16 PROCEDURE — 74011250636 HC RX REV CODE- 250/636: Performed by: HOSPITALIST

## 2020-06-16 PROCEDURE — 97530 THERAPEUTIC ACTIVITIES: CPT

## 2020-06-16 PROCEDURE — 87040 BLOOD CULTURE FOR BACTERIA: CPT

## 2020-06-16 PROCEDURE — 85025 COMPLETE CBC W/AUTO DIFF WBC: CPT

## 2020-06-16 RX ORDER — AMLODIPINE BESYLATE 10 MG/1
10 TABLET ORAL DAILY
Status: DISCONTINUED | OUTPATIENT
Start: 2020-06-16 | End: 2020-06-18 | Stop reason: HOSPADM

## 2020-06-16 RX ORDER — HYDRALAZINE HYDROCHLORIDE 50 MG/1
50 TABLET, FILM COATED ORAL
Status: DISCONTINUED | OUTPATIENT
Start: 2020-06-16 | End: 2020-06-18 | Stop reason: HOSPADM

## 2020-06-16 RX ADMIN — SODIUM BICARBONATE 650 MG TABLET 650 MG: at 15:11

## 2020-06-16 RX ADMIN — CEFTRIAXONE SODIUM 2 G: 2 INJECTION, POWDER, FOR SOLUTION INTRAMUSCULAR; INTRAVENOUS at 15:10

## 2020-06-16 RX ADMIN — HYDRALAZINE HYDROCHLORIDE 50 MG: 50 TABLET, FILM COATED ORAL at 21:40

## 2020-06-16 RX ADMIN — LORATADINE 10 MG: 10 TABLET ORAL at 08:49

## 2020-06-16 RX ADMIN — SODIUM BICARBONATE 650 MG TABLET 650 MG: at 08:49

## 2020-06-16 RX ADMIN — AMLODIPINE BESYLATE 10 MG: 10 TABLET ORAL at 12:40

## 2020-06-16 RX ADMIN — Medication 10 ML: at 13:36

## 2020-06-16 RX ADMIN — HEPARIN SODIUM 5000 UNITS: 5000 INJECTION INTRAVENOUS; SUBCUTANEOUS at 08:49

## 2020-06-16 RX ADMIN — TRAZODONE HYDROCHLORIDE 50 MG: 50 TABLET ORAL at 21:11

## 2020-06-16 RX ADMIN — HEPARIN SODIUM 5000 UNITS: 5000 INJECTION INTRAVENOUS; SUBCUTANEOUS at 15:11

## 2020-06-16 RX ADMIN — Medication 10 ML: at 06:27

## 2020-06-16 RX ADMIN — Medication 10 ML: at 21:11

## 2020-06-16 RX ADMIN — SODIUM BICARBONATE 650 MG TABLET 650 MG: at 21:11

## 2020-06-16 RX ADMIN — CALCITRIOL 0.5 MCG: 0.25 CAPSULE ORAL at 08:49

## 2020-06-16 NOTE — PROGRESS NOTES
Problem: Mobility Impaired (Adult and Pediatric)  Goal: *Therapy Goal (Edit Goal, Insert Text)  Outcome: Progressing Towards Goal continue for consistency 6/16/2020 . Note:   LTG:  (1.)Mr. Tarsha Penny will move from supine to sit and sit to supine , scoot up and down, and roll side to side in bed with INDEPENDENT within 7 treatment day(s). (2.)Mr. Tarsha Penny will transfer from bed to chair and chair to bed with INDEPENDENT using the least restrictive device within 7 treatment day(s). (3.)Mr. Tarsha Penny will ambulate with INDEPENDENT for 150 feet with the least restrictive device within 7 treatment day(s). ________________________________________________________________________________________________       PHYSICAL THERAPY: Daily Note and AM 6/16/2020  INPATIENT: PT Visit Days : 2  Payor: Rosamaria Kehr / [place] Wyman [facility] [place] / Product Type: [employer] Medicare /       NAME/AGE/GENDER: Robby Ragsdale is a 80 y.o. male   PRIMARY DIAGNOSIS: Sepsis ([facility] [address] [A41.9] Sepsis ([facility] [address] Sepsis ([facility] [address]       ICD-10: Treatment Diagnosis:    · Other abnormalities of gait and mobility (R26.89)   Precaution/Allergies:  Patient has no known allergies. ASSESSMENT:     Mr. Tarsha Penny presents in good spirits as a CV 19 R/O this AM.  All transfers are independent assistance x 1 out of bed to sit and stand then ambulation for 80 feet in the room with overall good balance. Therapeutic activities to assure patient safety. He is safe and stable in room upon conclusion of PT treatment today. Continue PT for consistency. Additional skilled PT is indicated for this patient's functional mobility deficits. He should not need additional PT upon discharge to home, where he is very active in his garden. He \"likes to stay busy,  I'm ready to go home. \"               This section established at most recent assessment   PROBLEM LIST (Impairments causing functional limitations):  1. Decreased Strength  2.  Decreased ADL/Functional Activities  3. Decreased Transfer Abilities  4. Decreased Ambulation Ability/Technique  5. Decreased Balance   INTERVENTIONS PLANNED: (Benefits and precautions of physical therapy have been discussed with the patient.)  1. Balance Exercise  2. Bed Mobility  3. Therapeutic Activites  4. Therapeutic Exercise/Strengthening     TREATMENT PLAN: Frequency/Duration: 4 times a week for duration of hospital stay  Rehabilitation Potential For Stated Goals: Good     REHAB RECOMMENDATIONS (at time of discharge pending progress):    Placement: It is my opinion, based on this patient's performance to date, that Mr. Amanda Rey may benefit from being discharged with NO further skilled therapy due to the high likelihood of returning to baseline. Equipment:    None at this time              HISTORY:   History of Present Injury/Illness (Reason for Referral):  PER MD H&P   Chief complaint fever weakness cough and shortness of breath     Patient is 80-year-old male with past medical history significant for hypertension, Asthma, gastric cancer status post resection, chronic kidney disease stage III, GERD presented to the ER because of fever weakness and shortness of breath. Patient stated that for the last 2 days he has been having fever and chills. Unsure of his temperature , but it was 103 -104 F. He has cough productive with white-colored phlegm. Shortness of breath at rest worse with activity. No orthopnea no paroxysmal nocturnal dyspnea. No chest pain no palpitations. No dysuria urgency or frequency of urination. He reported nonbilious, nonbloody vomiting and loose stools. No abdominal pain. He also noticed generalized weakness with muscle aches. No tingling numbness or focal weakness. 10 systems reviewed and negative except as noted in HPI. ER course  Fever with T-max of 103.34 night.   Patient is not tachycardic, blood pressure 170/86 and room air oxygen saturations of 88 to 92% currently on 2 - 3 L oxygen saturation 98%. CBC with white count of 7.3 with lymphopenia     CMP with sodium of 140 potassium 4.4 bicarbonate of 21 BUN 26 creatinine 2.05,      ALT 27 AST 63, lactic acid of 2.7, procalcitonin 0.22,     Chest x-ray on admission negative for acute cardiopulmonary abnormality. Urine analysis ordered. Blood cultures ordered. Fluids per sepsis protocol 30 mL/kg normal saline ordered. A dose of ceftriaxone and azithromycin ordered. Patient admitted for further evaluation management of suspected COVID-19 infection. Past Medical History/Comorbidities:   Mr. Marianela Baeza  has a past medical history of Asthma (2007), BPH (benign prostatic hypertrophy), Cancer ([facility] [address] Chronic kidney disease (2004), COPD (chronic obstructive pulmonary disease) ([facility] [address] (7/28/2011), GERD (gastroesophageal reflux disease), Gout, Hemorrhoids, internal, History of diverticulosis, Hypercholesteremia, Hypertension, Muscle spasm, OA (osteoarthritis), REZA (obstructive sleep apnea) (7/28/2011), Osteoporosis, Personal history of colonic polyps, Prediabetes, and Wears dentures. Mr. Marianela Baeza  has a past surgical history that includes biopsy prostate (2008); pr sinus surgery proc unlisted (2005); hx prostatectomy (2002); hx gastrectomy (2004); and hx knee replacement (Bilateral). Social History/Living Environment:   Home Environment: Trailer/mobile home  # Steps to Enter: 8  Rails to Enter: Yes  [employer] : Bilateral  One/Two Story Residence: One story  Living Alone: Yes  Support Systems: Family member(s)  Patient Expects to be Discharged to[de-identified] Trailer/mobile home  Current DME Used/Available at Home: None  Prior Level of Function/Work/Activity: This kind patient had been independent with all prior functional mobility. Dominant Side:         RIGHT  Personal Factors:          Sex:  male        Age:  80 y.o.    Number of Personal Factors/Comorbidities that affect the Plan of Care: 0: LOW COMPLEXITY   EXAMINATION:   Most Recent Physical Functioning:   Gross Assessment:  AROM: Generally decreased, functional  PROM: Generally decreased, functional  Strength: Generally decreased, functional  Coordination: Generally decreased, functional  Tone: Normal  Sensation: Intact               Posture:  Posture (WDL): Exceptions to WDL  Posture Assessment: Cervical, Forward head  Balance:  Sitting: Intact  Standing: Intact Bed Mobility:  Rolling: Independent  Supine to Sit: Independent  Sit to Supine: Independent  Scooting: Independent  Wheelchair Mobility:     Transfers:  Sit to Stand: Independent  Stand to Sit: Independent  Bed to Chair: Independent  Gait:     Base of Support: Center of gravity altered  Speed/Joyce: Fluctuations  Distance (ft): 80 Feet (ft)  Assistive Device: (no assistive device)      Body Structures Involved:  1. Bones  2. Joints  3. Muscles  4. Ligaments Body Functions Affected:  1. Neuromusculoskeletal  2. Movement Related  3. Skin Related  4. Metobolic/Endocrine Activities and Participation Affected:  1. General Tasks and Demands  2. Communication  3. Mobility  4. Self Care  5. Domestic Life  6. Community, Social and [place]   Number of elements that affect the Plan of Care: 1-2: LOW COMPLEXITY   CLINICAL PRESENTATION:   Presentation: Stable and uncomplicated: LOW COMPLEXITY   CLINICAL DECISION MAKIN [facility] Mobility Inpatient Short Form  How much difficulty does the patient currently have. .. Unable A Lot A Little None   1. Turning over in bed (including adjusting bedclothes, sheets and blankets)? [] 1   [] 2   [] 3   [x] 4   2. Sitting down on and standing up from a chair with arms ( e.g., wheelchair, bedside commode, etc.)   [] 1   [] 2   [] 3   [x] 4   3. Moving from lying on back to sitting on the side of the bed? [] 1   [] 2   [] 3   [x] 4   How much help from another person does the patient currently need. .. Total A Lot A Little None   4.   Moving to and from a bed to a chair (including a wheelchair)? [] 1   [] 2   [] 3   [x] 4   5. Need to walk in hospital room? [] 1   [] 2   [] 3   [x] 4   6. Climbing 3-5 steps with a railing? [] 1   [] 2   [x] 3   [] 4   © 2007, Trustees of [facility] [place] [employer], under license to [employer]. All rights reserved      Score:  Initial: 23 Most Recent: X (Date: -- )    Interpretation of Tool:  Represents activities that are increasingly more difficult (i.e. Bed mobility, Transfers, Gait). Medical Necessity:     · Patient demonstrates good rehab potential due to higher previous functional level. Reason for Services/Other Comments:  · Patient continues to require skilled intervention due to mild debility and decreased mobility. \.   Use of outcome tool(s) and clinical judgement create a POC that gives a: Clear prediction of patient's progress: LOW COMPLEXITY            TREATMENT:   (In addition to Assessment/Re-Assessment sessions the following treatments were rendered)   Pre-treatment Symptoms/Complaints:  0/10  Pain: Initial:   Pain Intensity 1: 0  Post Session:  0/10    Evaluation and   therapeutic activity: (    25 mins): Therapeutic activities including Bed transfers, Chair transfers and Ambulation on level ground to improve mobility, strength, balance and coordination. Required minimal   to promote coordination of bilateral, lower extremity(s). Braces/Orthotics/Lines/Etc:   · O2 Device: Room air  Treatment/Session Assessment:    · Response to Treatment:  Improved transfers, ambulation and mobility. · Interdisciplinary Collaboration:   o Physical Therapist  · After treatment position/precautions:   o Up in chair  o Bed alarm/tab alert on  o Bed/Chair-wheels locked  o Call light within reach  o Side rails x 3   · Compliance with Program/Exercises: Compliant all of the time  · Recommendations/Intent for next treatment session:   \"Next visit will focus on advancements to more challenging activities, reduction in assistance provided and increased transfers, ambulation and mobility. \".   Total Treatment Duration:  PT Patient Time In/Time Out  Time In: 1115  Time Out: [address] [facility]

## 2020-06-16 NOTE — PROGRESS NOTES
Late entry progress note for visit on 6.15.20:   's visit via phone call attempted. The call was unanswered. Chaplains remain available follow-up.      Carly Coleman MDIV, [facility]

## 2020-06-16 NOTE — PROGRESS NOTES
Received bedside shift report from offgoing nurse, Benjamin Goldman. Patient resting quietly in bed at this time, awake, respirations even and unlabored on room air. Denies needs at this time. Bed low and locked. Bedside table, personal belongings and call light within reach.

## 2020-06-16 NOTE — PROGRESS NOTES
KYLIE received a call from Deborah Skinner[facility]. [facility] Skinner provided her direct contact information to remain available with assisting in coordinating any needed services at time of discharge (e.g. providing list of in network providers). KYLIE informed that current DC plan is home with no anticipated needs. [facility] to provide transitional care RN telephone follow up for patient post-discharge. DC PLAN remains home with no anticipated needs.

## 2020-06-16 NOTE — PROGRESS NOTES
All scheduled morning labs collected including two sets of blood cultures. Sent to lab via tube system at approximately 0419.

## 2020-06-16 NOTE — ACP (ADVANCE CARE PLANNING)
SW completed ACP discussion with patient in room 501 via telephone contact. Patient identified his daughter as healthcare decision maker, and states he has already clearly established his healthcare preferences with physician at time of admission (DNR status). Advance Care Planning     Advance Care Planning Activator (Inpatient)  Conversation Note      Date of ACP Conversation: 06/16/20     Conversation Conducted with:   Patient with Decision Making Capacity    ACP Activator: [facility] makes decisions on behalf of the incapacitated patient: Decision Maker is asked to consider and make decisions based on patient values, known preferences, or best interests. Health Care Decision Maker:    Current Designated Health Care Decision Maker:   Primary Decision Maker: Ebony Liriano - [phone]  (If there is a [address] named in the \"Healthcare Decision Makers\" box in the ACP activity, but it is not visible above, be sure to open that field and then select the health care decision maker relationship (ie \"primary\") in the blank space to the right of the name.) Validate  this information as still accurate & up-to-date; edit [place] field as needed.)    Note: Assess and validate information in current ACP documents, as indicated. If no Decision Maker listed above or available through scanned documents, then:    If no Authorized Decision Maker has previously been identified, then patient chooses [place]:  \"Who would you like to name as your primary health care decision-maker? \"    Name: Forrest Wilks   Relationship: Child  Phone number: [phone]  Valentino Harmon this person be reached easily? \" YES      Note: If the relationship of these Decision-Makers to the patient does NOT follow your state's Next of Kin hierarchy, recommend that patient complete ACP document that meets state-specific requirements to allow them to act on the patient's behalf when appropriate. Care Preferences    Ventilation: \"If you were in your present state of health and suddenly became very ill and were unable to breathe on your own, what would your preference be about the use of a ventilator (breathing machine) if it were available to you? \"      If patient would desire the use of a ventilator (breathing machine), answer \"yes\", if not \"no\": NO    \"If your health worsens and it becomes clear that your chance of recovery is unlikely, what would your preference be about the use of a ventilator (breathing machine) if it were available to you? \"     Would the patient desire the use of a ventilator (breathing machine)? NO      Resuscitation  \"CPR works best to restart the heart when there is a sudden event, like a heart attack, in someone who is otherwise healthy. Unfortunately, CPR does not typically restart the heart for people who have serious health conditions or who are very sick. \"    \"In the event your heart stopped as a result of an underlying serious health condition, would you want attempts to be made to restart your heart (answer \"yes\" for attempt to resuscitate) or would you prefer a natural death (answer \"no\" for do not attempt to resuscitate)? \" no      NOTE: If the patient has a valid advance directive AND now provides care preference(s) that are inconsistent with that prior directive, advise the patient to consider either: creating a new advance directive that complies with state-specific requirements; or, if that is not possible, orally revoking that prior directive in accordance with state-specific requirements, which must be documented in the EHR. [x] Yes  [] No   Educated Patient / Ranjit Hough regarding differences between Advance Directives and portable DNR orders.     Length of ACP Conversation in minutes:      Conversation Outcomes:  [x] ACP discussion completed  [] Existing advance directive reviewed with patient; no changes to patient's previously recorded wishes     [] New Advance Directive completed   [] Portable Do Not Resuscitate prepared for Provider review and signature  [] POLST/POST/MOLST/MOST prepared for Provider review and signature      Follow-up plan:    [] Schedule follow-up conversation to continue planning  [] Referred individual to Provider for additional questions/concerns   [] Advised patient/agent/surrogate to review completed ACP document and update if needed with changes in condition, patient preferences or care setting     [] This note routed to one or more involved healthcare providers  No additional follow up required at this time. These are patient's current wishes as discussed today and are not intended to take place of [address]

## 2020-06-16 NOTE — PROGRESS NOTES
Bedside shift report completed with oncoming nurse, Garima Enciso. Patient resting quietly in bed at this time, awake, respirations even and unlabored on room air. Denies needs at this time. Bed low and locked. Bedside table, personal belongings and call light within reach.

## 2020-06-16 NOTE — PROGRESS NOTES
SW reviewed patient's chart and contacted patient via telephone call to room 501 to complete ACP discussions and DC planning. Patient has been admitted under inpatient status on 2020 by hospitalist service due to complaints of fever, weakness, SOB. SOCIAL:  Patient lives alone in his own home ([facility] home) with 8 step entry with bilateral handrails and walk-in shower. His wife is recently  (2020). Patient's daughter, Satnam Corona [phone] is his emergency contact. Patient has 4 living children. Gale Abreu and Amelia live in [place], but his 3rd daughter lives in [place]. Patient is not a . He confirmed his insurance as [employer]. His PCP is confirmed as listed. Confirms he is able to complete virtual visits. He denies any difficulties obtaining RX affordably. No HCPOA or advance directive - not interested in completing documents currently. Confirmed history of inpatient psychiatric hosptialization at [facility] in [facility]. Current outpatient mental health services with [facility] (2x per month at his home). Source of income - social security. MOBILITY / HISTORY:  At baseline, patient is fully independent with ambulation, ADLs, and driving himself. DME at home - rollator, walker, cane, wheelchair  No home oxygen. No HD history. No HH or STR history. No privately paid sitters, aids, caregivers, or CLTC hours. PLAN FOR DISCHARGE:  Anticipate DC to home with no needs identified. PT/OT evaluations completed and both recommend no continued therapy services. Pending repeat blood cultures. Pending COVID19 test results. Care Management Interventions  PCP Verified by CM: Yes  Mode of Transport at Discharge:  Other (see comment)  Transition of Care Consult (CM Consult): Discharge Planning  Discharge Durable Medical Equipment: No  Physical Therapy Consult: Yes  Occupational Therapy Consult: Yes  Speech Therapy Consult: No  Current Support Network: Lives Alone, Own Home  Confirm Follow Up Transport: Self  The Patient and/or Patient Representative was Provided with a Choice of Provider and Agrees with the Discharge Plan?: Yes  Discharge Location  Discharge Placement: Home            Care Management Interventions  PCP Verified by CM: Yes  Mode of Transport at Discharge:  Other (see comment)  Transition of Care Consult (CM Consult): Discharge Planning  Discharge Durable Medical Equipment: No  Physical Therapy Consult: Yes  Occupational Therapy Consult: Yes  Speech Therapy Consult: No  Current Support Network: Lives Alone, Own Home  Confirm Follow Up Transport: Self  The Patient and/or Patient Representative was Provided with a Choice of Provider and Agrees with the Discharge Plan?: Yes  Discharge Location  Discharge Placement: Home

## 2020-06-16 NOTE — PROGRESS NOTES
Hospitalist Progress Note    Admission Date: 2020  3:06 PM  Reason for Admission/Hospital Course: [facility] [A41.9]      24 Hour Events:  Patient seen and examined at bedside this morning. Patient reports feeling well overnight. He denies any fevers, chills, shortness of breath, chest pain, nausea, vomiting. Patient has no other complaints at this time. ROS:  10 point review of systems is otherwise negative with the exception of the elements mentioned above. No Known Allergies    OBJECTIVE:  Patient Vitals for the past 8 hrs:   BP Temp Pulse Resp SpO2   20 0756 159/69 98.5 °F (36.9 °C) (!) 55 18 96 %   20 0611  98.2 °F (36.8 °C)        Temp (24hrs), Av.3 °F (36.8 °C), Min:97.9 °F (36.6 °C), Max:98.9 °F (37.2 °C)     0701 -  1900  In: 60 [P.O.:60]  Out: 400 [Urine:400]    PHYSICAL EXAM:  General: Well-appearing black male, no acute distress. Resting comfortably in bed       HEENT: Normocephalic, atraumatic. Oral mucosa is moist.  PERRLA   CV:  RRR, no murmurs   Lungs: Clear to auscultation bilaterally   Abdomen:   Soft, nontender, nondistended, normal bowel sounds  MSK: No gross skeletal deformities   Skin: Clean, dry, intact. No rashes or jaundice     Neuro: CN II through XII grossly intact    Psych: Appropriate mood and affect. Pleasant.         Labs:      Recent Labs     20  0417 06/15/20  0730 20  1525   WBC 5.6 12.4* 7.3   RBC 3.97* 3.87* 5.40   HGB 10.8* 10.8* 14.5   HCT 33.1* 31.6* 45.9   MCV 83.4 81.7 85.0   MCH 27.2 27.9 26.9   MCHC 32.6 34.2 31.6   RDW 15.0* 15.1* 15.1*    183 168   GRANS 54 71 88*   LYMPH 20 14 5*   MONOS 13* 10 1*   EOS 12* 5 5   BASOS 0 0 1   IG 0 0 0   DF AUTOMATED AUTOMATED AUTOMATED   ANEU 3.1 8.9* 6.4   ABL 1.2 1.7 0.4*   ABM 0.7 1.2 0.1   BRISEYDA 0.7 0.6 0.4   ABB 0.0 0.0 0.0   AIG 0.0 0.1 0.0        Recent Labs     20  0417 06/15/20  0435 20  1530 20  1525    140  --  140   K 3.8 4.4  -- 4.4   * 110*  --  109*   CO2 23 22  --  21   AGAP 7 8  --  10   GLU 97 92  --  100   BUN 25* 26*  --  26*   CREA 2.04* 2.17*  --  2.05*   GFRAA 40* 37*  --  40*   GFRNA 33* 31*  --  33*   CA 8.1* 7.7*  --  8.8    125  --  153*   TP 6.2* 6.5  --  8.0   ALB 2.4* 2.5*  --  3.0*   GLOB 3.8* 4.0*  --  5.0*   AGRAT 0.6* 0.6*  --  0.6*   MG  --   --  2.0  --        Recent Results (from the past 24 hour(s))   CULTURE, URINE    Collection Time: 06/15/20 12:09 PM   Result Value Ref Range    Special Requests: NO SPECIAL REQUESTS      Culture result: NO GROWTH 1 DAY     CBC WITH AUTOMATED DIFF    Collection Time: 06/16/20  4:17 AM   Result Value Ref Range    WBC 5.6 4.3 - 11.1 K/uL    RBC 3.97 (L) 4.23 - 5.6 M/uL    HGB 10.8 (L) 13.6 - 17.2 g/dL    HCT 33.1 (L) 41.1 - 50.3 %    MCV 83.4 79.6 - 97.8 FL    MCH 27.2 26.1 - 32.9 PG    MCHC 32.6 31.4 - 35.0 g/dL    RDW 15.0 (H) 11.9 - 14.6 %    PLATELET 956 090 - 280 K/uL    MPV 11.0 9.4 - 12.3 FL    ABSOLUTE NRBC 0.00 0.0 - 0.2 K/uL    DF AUTOMATED      NEUTROPHILS 54 43 - 78 %    LYMPHOCYTES 20 13 - 44 %    MONOCYTES 13 (H) 4.0 - 12.0 %    EOSINOPHILS 12 (H) 0.5 - 7.8 %    BASOPHILS 0 0.0 - 2.0 %    IMMATURE GRANULOCYTES 0 0.0 - 5.0 %    ABS. NEUTROPHILS 3.1 1.7 - 8.2 K/UL    ABS. LYMPHOCYTES 1.2 0.5 - 4.6 K/UL    ABS. MONOCYTES 0.7 0.1 - 1.3 K/UL    ABS. EOSINOPHILS 0.7 0.0 - 0.8 K/UL    ABS. BASOPHILS 0.0 0.0 - 0.2 K/UL    ABS. IMM.  GRANS. 0.0 0.0 - 0.5 K/UL   METABOLIC PANEL, COMPREHENSIVE    Collection Time: 06/16/20  4:17 AM   Result Value Ref Range    Sodium 143 136 - 145 mmol/L    Potassium 3.8 3.5 - 5.1 mmol/L    Chloride 113 (H) 98 - 107 mmol/L    CO2 23 21 - 32 mmol/L    Anion gap 7 7 - 16 mmol/L    Glucose 97 65 - 100 mg/dL    BUN 25 (H) 8 - 23 MG/DL    Creatinine 2.04 (H) 0.8 - 1.5 MG/DL    GFR est AA 40 (L) >60 ml/min/1.73m2    GFR est non-AA 33 (L) >60 ml/min/1.73m2    Calcium 8.1 (L) 8.3 - 10.4 MG/DL    Bilirubin, total 0.2 0.2 - 1.1 MG/DL    ALT (SGPT) 19 12 - 65 U/L    AST (SGOT) 25 15 - 37 U/L    Alk. phosphatase 116 50 - 136 U/L    Protein, total 6.2 (L) 6.3 - 8.2 g/dL    Albumin 2.4 (L) 3.2 - 4.6 g/dL    Globulin 3.8 (H) 2.3 - 3.5 g/dL    A-G Ratio 0.6 (L) 1.2 - 3.5     TSH 3RD GENERATION    Collection Time: 06/16/20  4:17 AM   Result Value Ref Range    TSH 2.840 0.358 - 3.740 uIU/mL       Current Facility-Administered Medications   Medication Dose Route Frequency    loratadine (CLARITIN) tablet 10 mg  10 mg Oral DAILY    calcitRIOL (ROCALTROL) capsule 0.5 mcg  0.5 mcg Oral DAILY    sodium bicarbonate tablet 650 mg  650 mg Oral TID    traZODone (DESYREL) tablet 50 mg  50 mg Oral QHS    heparin (porcine) injection 5,000 Units  5,000 Units SubCUTAneous Q8H    acetaminophen (TYLENOL) tablet 650 mg  650 mg Oral Q6H PRN    Or    acetaminophen (TYLENOL) suppository 650 mg  650 mg Rectal Q6H PRN    sodium chloride (NS) flush 5-40 mL  5-40 mL IntraVENous Q8H    sodium chloride (NS) flush 5-40 mL  5-40 mL IntraVENous PRN    cefTRIAXone (ROCEPHIN) 2 g in 0.9% sodium chloride (MBP/ADV) 50 mL  2 g IntraVENous Q24H    albuterol (PROVENTIL VENTOLIN) nebulizer solution 2.5 mg  2.5 mg Nebulization Q4H PRN          Imaging:    Ct Abd Pelv Wo Cont    Result Date: 6/15/2020  CT ABDOMEN AND PELVIS WITHOUT INTRAVENOUS CONTRAST. HISTORY: Vomiting and E. coli bacteremia. COMPARISON: August 2010 TECHNIQUE: 5 mm axial scans from above the diaphragms to the pubic symphysis following oral contrast only. Radiation dose reduction techniques were used for this study. Our CT scanners use one or more of the following:  Automated exposure control, adjustment of the mA and or kV according to patient size, iterative reconstruction. FINDINGS: -Lung Bases: The lung bases trace pleural fluid. -Liver: Large amount of pneumobilia. Gallbladder is absent. -Bile Ducts: Prominent. -Pancreas: Fatty atrophy. -Spleen: Uniform and normal size. -Stomach: Unremarkable. -Bowel: Normal caliber.   No inflammatory changes. Extensive diverticulosis, especially the left colon. -Kidneys/Ureters: Normal size. No hydronephrosis. Several simple and mildly complex cysts. -Urinary Bladder: Unremarkable. -Adrenals: Are normal size. -Reproductive Organs: Unremarkable. -Lymph Nodes: No grossly enlarged retroperitoneal, mesenteric, or pelvic adenopathy. -Vasculature: Aorta is normal caliber, tortuous and mildly calcified. . -Bones: Mild scoliosis. . -Other: No ascites. IMPRESSION:  Prominent pneumobilia, status post cholecystectomy which can be normal. Extensive diverticulosis without acute inflammation. Xr Chest Port    Result Date: 6/14/2020  History: Fever, productive cough Exam: portable chest Comparison: 2/28/2019 Findings: No new alveolar infiltrate or pleural effusion. No change in the appearance of the mediastinal contour or osseous structures. Impressions: Stable portable chest          ASSESSMENT:    Problem List  Date Reviewed: 7/31/2018          Codes Class Noted    * (Principal) Sepsis ([facility] ICD-10-CM: A41.9  ICD-9-CM: 038.9, 995.91  6/14/2020        Suspected COVID-19 virus infection ICD-10-CM: Z20.828  ICD-9-CM: V01.79  6/14/2020        Acute hypoxemic respiratory failure (HCC) ICD-10-CM: J96.01  ICD-9-CM: 518.81  6/14/2020        Lactic acidosis ICD-10-CM: E87.2  ICD-9-CM: 276.2  6/14/2020        CKD (chronic kidney disease) stage 3, GFR 30-59 ml/min (HCC) ICD-10-CM: N18.3  ICD-9-CM: 585.3  8/23/2019        Gastroesophageal reflux disease without esophagitis ICD-10-CM: K21.9  ICD-9-CM: 530.81  4/20/2018        Prediabetes ICD-10-CM: R73.03  ICD-9-CM: 790.29  5/25/2016        Idiopathic chronic gout of multiple sites without tophus ICD-10-CM: M1A. 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  Last echo (9/14/10):EF 55-60%, normal LVSF. Stress test (9/14/10): EF 56%,mild inferoseptal scar. Osteoporosis ICD-10-CM: M81.0  ICD-9-CM: 733.00  Unknown        Status post total knee replacement ICD-10-CM: D21.238  ICD-9-CM: V43.65  10/14/2011        Osteoarthritis of knee (Chronic) ICD-10-CM: M17.10  ICD-9-CM: 715.36  7/28/2011        Knee arthroplasty ICD-9-CM: V43.65  7/28/2011        Hypertensive CKD (chronic kidney disease) ICD-10-CM: I12.9  ICD-9-CM: 403.90  7/28/2011        REZA (obstructive sleep apnea) ICD-10-CM: G47.33  ICD-9-CM: 327.23  7/28/2011        COPD (chronic obstructive pulmonary disease) ([facility] ICD-10-CM: J44.9  ICD-9-CM: 496  7/28/2011        Pro[facility] cancer ([facility] ICD-10-CM: C61  ICD-9-CM: 185  7/28/2011    Overview Signed 7/28/2011 10:05 PM by Mary Kay Mccauley NP     Currently on eligard                       PLAN:    Sepsis secondary to E. Coli bacteremia -improving  Currently remains afebrile overnight. White count has defervesced. We will continue with antibiotic treatment for now  Final cultures and sensitivities still pending.  - Follow-up repeat blood cultures  - Continue with ceftriaxone   - Hold off on echocardiogram for now    Rule out COVID-19  Patient currently on minimal oxygen requirements. Emergent disease panel still pending. LDH, ferritin, lactic acid all within normal limits. Low suspicion for COVID at this time.  continue with isolation until COVID results are returned.  If negative, DC isolation precautions and sent to medical floor.     CKD stage III  Creatinine at baseline. Continue to monitor for now     COPD/asthma  Patient currently not in any respiratory distress. Resting comfortably on room air.  Albuterol as needed.       Sinus Bradycardia:   Heart rate still noted to be low overnight. However patient remains asymptomatic.   TSH within normal limits  Continue holding metoprolol for now    Fluids: Not indicated  Electrolytes: Replete as needed  Nutrition: Cardiac diet  CODE STATUS: DNR  DVT prophylaxis: Heparin subcu    DPOA: Patient's wife [phone]     Estimated LOS: Anticipate discharge home in 48 hours.

## 2020-06-17 LAB
ALBUMIN SERPL-MCNC: 2.6 G/DL (ref 3.2–4.6)
ALBUMIN/GLOB SERPL: 0.6 {RATIO} (ref 1.2–3.5)
ALP SERPL-CCNC: 112 U/L (ref 50–136)
ALT SERPL-CCNC: 19 U/L (ref 12–65)
ANION GAP SERPL CALC-SCNC: 7 MMOL/L (ref 7–16)
AST SERPL-CCNC: 22 U/L (ref 15–37)
BACTERIA SPEC CULT: NORMAL
BASOPHILS # BLD: 0 K/UL (ref 0–0.2)
BASOPHILS NFR BLD: 0 % (ref 0–2)
BILIRUB SERPL-MCNC: 0.3 MG/DL (ref 0.2–1.1)
BUN SERPL-MCNC: 19 MG/DL (ref 8–23)
CALCIUM SERPL-MCNC: 8.7 MG/DL (ref 8.3–10.4)
CHLORIDE SERPL-SCNC: 109 MMOL/L (ref 98–107)
CO2 SERPL-SCNC: 25 MMOL/L (ref 21–32)
CREAT SERPL-MCNC: 2.06 MG/DL (ref 0.8–1.5)
DIFFERENTIAL METHOD BLD: ABNORMAL
EMERGENT DISEASE PANEL, EDPR: NOT DETECTED
EOSINOPHIL # BLD: 0.5 K/UL (ref 0–0.8)
EOSINOPHIL NFR BLD: 11 % (ref 0.5–7.8)
ERYTHROCYTE [DISTWIDTH] IN BLOOD BY AUTOMATED COUNT: 14.7 % (ref 11.9–14.6)
GLOBULIN SER CALC-MCNC: 4.4 G/DL (ref 2.3–3.5)
GLUCOSE SERPL-MCNC: 99 MG/DL (ref 65–100)
HCT VFR BLD AUTO: 34.6 % (ref 41.1–50.3)
HGB BLD-MCNC: 11.8 G/DL (ref 13.6–17.2)
IMM GRANULOCYTES # BLD AUTO: 0 K/UL (ref 0–0.5)
IMM GRANULOCYTES NFR BLD AUTO: 0 % (ref 0–5)
LYMPHOCYTES # BLD: 0.9 K/UL (ref 0.5–4.6)
LYMPHOCYTES NFR BLD: 21 % (ref 13–44)
MCH RBC QN AUTO: 27.6 PG (ref 26.1–32.9)
MCHC RBC AUTO-ENTMCNC: 34.1 G/DL (ref 31.4–35)
MCV RBC AUTO: 80.8 FL (ref 79.6–97.8)
MONOCYTES # BLD: 0.7 K/UL (ref 0.1–1.3)
MONOCYTES NFR BLD: 15 % (ref 4–12)
NEUTS SEG # BLD: 2.3 K/UL (ref 1.7–8.2)
NEUTS SEG NFR BLD: 53 % (ref 43–78)
NRBC # BLD: 0 K/UL (ref 0–0.2)
PLATELET # BLD AUTO: 210 K/UL (ref 150–450)
PMV BLD AUTO: 11 FL (ref 9.4–12.3)
POTASSIUM SERPL-SCNC: 4.2 MMOL/L (ref 3.5–5.1)
PROT SERPL-MCNC: 7 G/DL (ref 6.3–8.2)
RBC # BLD AUTO: 4.28 M/UL (ref 4.23–5.6)
SERVICE CMNT-IMP: NORMAL
SODIUM SERPL-SCNC: 141 MMOL/L (ref 136–145)
WBC # BLD AUTO: 4.4 K/UL (ref 4.3–11.1)

## 2020-06-17 PROCEDURE — 74011250637 HC RX REV CODE- 250/637: Performed by: HOSPITALIST

## 2020-06-17 PROCEDURE — 74011250637 HC RX REV CODE- 250/637: Performed by: INTERNAL MEDICINE

## 2020-06-17 PROCEDURE — 97530 THERAPEUTIC ACTIVITIES: CPT

## 2020-06-17 PROCEDURE — 65270000029 HC RM PRIVATE

## 2020-06-17 PROCEDURE — 85025 COMPLETE CBC W/AUTO DIFF WBC: CPT

## 2020-06-17 PROCEDURE — 74011000258 HC RX REV CODE- 258: Performed by: HOSPITALIST

## 2020-06-17 PROCEDURE — 74011250636 HC RX REV CODE- 250/636: Performed by: HOSPITALIST

## 2020-06-17 PROCEDURE — 80053 COMPREHEN METABOLIC PANEL: CPT

## 2020-06-17 RX ORDER — METOPROLOL SUCCINATE 50 MG/1
50 TABLET, EXTENDED RELEASE ORAL DAILY
Status: DISCONTINUED | OUTPATIENT
Start: 2020-06-17 | End: 2020-06-18 | Stop reason: HOSPADM

## 2020-06-17 RX ADMIN — METOPROLOL SUCCINATE 50 MG: 50 TABLET, EXTENDED RELEASE ORAL at 16:14

## 2020-06-17 RX ADMIN — ACETAMINOPHEN 650 MG: 325 TABLET, FILM COATED ORAL at 03:04

## 2020-06-17 RX ADMIN — Medication 10 ML: at 22:01

## 2020-06-17 RX ADMIN — ACETAMINOPHEN 650 MG: 325 TABLET, FILM COATED ORAL at 22:00

## 2020-06-17 RX ADMIN — HEPARIN SODIUM 5000 UNITS: 5000 INJECTION INTRAVENOUS; SUBCUTANEOUS at 23:39

## 2020-06-17 RX ADMIN — SODIUM BICARBONATE 650 MG TABLET 650 MG: at 08:01

## 2020-06-17 RX ADMIN — AMLODIPINE BESYLATE 10 MG: 10 TABLET ORAL at 08:02

## 2020-06-17 RX ADMIN — Medication 10 ML: at 15:29

## 2020-06-17 RX ADMIN — CALCITRIOL 0.5 MCG: 0.25 CAPSULE ORAL at 08:01

## 2020-06-17 RX ADMIN — Medication 10 ML: at 06:01

## 2020-06-17 RX ADMIN — SODIUM BICARBONATE 650 MG TABLET 650 MG: at 22:00

## 2020-06-17 RX ADMIN — HEPARIN SODIUM 5000 UNITS: 5000 INJECTION INTRAVENOUS; SUBCUTANEOUS at 07:59

## 2020-06-17 RX ADMIN — SODIUM BICARBONATE 650 MG TABLET 650 MG: at 15:29

## 2020-06-17 RX ADMIN — TRAZODONE HYDROCHLORIDE 50 MG: 50 TABLET ORAL at 22:00

## 2020-06-17 RX ADMIN — LORATADINE 10 MG: 10 TABLET ORAL at 08:01

## 2020-06-17 RX ADMIN — CEFTRIAXONE SODIUM 2 G: 2 INJECTION, POWDER, FOR SOLUTION INTRAMUSCULAR; INTRAVENOUS at 15:29

## 2020-06-17 RX ADMIN — HEPARIN SODIUM 5000 UNITS: 5000 INJECTION INTRAVENOUS; SUBCUTANEOUS at 15:29

## 2020-06-17 RX ADMIN — HEPARIN SODIUM 5000 UNITS: 5000 INJECTION INTRAVENOUS; SUBCUTANEOUS at 00:07

## 2020-06-17 NOTE — PROGRESS NOTES
Problem: Falls - Risk of  Goal: *Absence of Falls  Description: Document Tiana Estevez Fall Risk and appropriate interventions in the flowsheet.   Outcome: Progressing Towards Goal  Note: Fall Risk Interventions:            Medication Interventions: Evaluate medications/consider consulting pharmacy, Teach patient to arise slowly

## 2020-06-17 NOTE — PROGRESS NOTES
Pt covid 19 non detected, message sent to Dr Tiffany Anne via perfect serve re results and transfer to medical unit

## 2020-06-17 NOTE — PROGRESS NOTES
Bedside shift report completed with oncoming nurseLAUREL [facility]. Patient resting quietly in bed at this time, awake, respirations even and unlabored on room air. Denies needs at this time. Bed low and locked. Bedside table, personal belongings and call light within reach.

## 2020-06-17 NOTE — PROGRESS NOTES
Problem: Mobility Impaired (Adult and Pediatric)  Goal: *Therapy Goal  Note:   LTG:  (1.)Mr. Lona Pappas will move from supine to sit and sit to supine , scoot up and down, and roll side to side in bed with INDEPENDENT within 7 treatment day(s). (2.)Mr. Lona Pappas will transfer from bed to chair and chair to bed with INDEPENDENT using the least restrictive device within 7 treatment day(s). (3.)Mr. Lona Pappas will ambulate with INDEPENDENT for 150 feet with the least restrictive device within 7 treatment day(s). ________________________________________________________________________________________________    PHYSICAL THERAPY: Daily Note and Discharge 6/17/2020  INPATIENT: PT Visit Days : 2  Payor: Denae Story / Plan: [facility]jar[facility] [place]AGE / Product Type: Managed Care Medicare /       NAME/AGE/GENDER: Brandee Castillo is a 80 y.o. male   PRIMARY DIAGNOSIS: Sepsis ([facility] [A41.9] Sepsis ([facility] Sepsis ([facility]       ICD-10: Treatment Diagnosis:    · Other abnormalities of gait and mobility (R26.89)   Precaution/Allergies:  Patient has no known allergies. ASSESSMENT:     Upon entering, pt resting in chair, agreeable to PT.  he reports no pain at rest. On room air. Pt encouraged this morning as his test results came back negative, he is hopeful to go home soon. Sit <> stand transfers performed with independence. Pt ambulated x 100 ft with independence. Dynamic standing balance activities including narrowed NASH walking; good balance control. PT instructed pt in seated and standing strengthening exercises - pt verbalized and demonstrated good understanding and safety, tolerated well. At end of session pt sitting up in chair with all needs within reach, RN notified. Pt has met all acute PT goals, has no acute PT needs, will DC PT at this time. This section established at most recent assessment   PROBLEM LIST (Impairments causing functional limitations):  1. Decreased Strength  2.  Decreased ADL/Functional Activities  3. Decreased Transfer Abilities  4. Decreased Ambulation Ability/Technique  5. Decreased Balance   INTERVENTIONS PLANNED: (Benefits and precautions of physical therapy have been discussed with the patient.)  1. Balance Exercise  2. Bed Mobility  3. Therapeutic Activites  4. Therapeutic Exercise/Strengthening     TREATMENT PLAN: Frequency/Duration: DC PT. No acute PT needs. Rehabilitation Potential For Stated Goals: Good     REHAB RECOMMENDATIONS (at time of discharge pending progress):    Placement: It is my opinion, based on this patient's performance to date, that Mr. Tatiana Chapman may benefit from being discharged with NO further skilled therapy due to the high likelihood of returning to baseline. Equipment:    None at this time            HISTORY:   History of Present Injury/Illness (Reason for Referral):  PER MD H&P   Chief complaint fever weakness cough and shortness of breath. Patient is 80-year-old male with past medical history significant for hypertension, Asthma, gastric cancer status post resection, chronic kidney disease stage III, GERD presented to the ER because of fever weakness and shortness of breath. Patient stated that for the last 2 days he has been having fever and chills. Unsure of his temperature , but it was 103 -104 F. He has cough productive with white-colored phlegm. Shortness of breath at rest worse with activity. No orthopnea no paroxysmal nocturnal dyspnea. No chest pain no palpitations. No dysuria urgency or frequency of urination. He reported nonbilious, nonbloody vomiting and loose stools. No abdominal pain. He also noticed generalized weakness with muscle aches. No tingling numbness or focal weakness. 10 systems reviewed and negative except as noted in HPI  ER course  Fever with T-max of 103.34 night. Patient is not tachycardic, blood pressure 170/86 and room air oxygen saturations of 88 to 92% currently on 2 - 3 L oxygen saturation 98%.    CBC with white count of 7.3 with lymphopenia  CMP with sodium of 140 potassium 4.4 bicarbonate of 21 BUN 26 creatinine 2.05,   ALT 27 AST 63, lactic acid of 2.7, procalcitonin 0.22   Chest x-ray on admission negative for acute cardiopulmonary abnormality. Urine analysis ordered. Blood cultures ordered   Fluids per sepsis protocol 30 mL/kg normal saline ordered. A dose of ceftriaxone and azithromycin ordered. Patient admitted for further evaluation management of suspected COVID-19 infection. Past Medical History/Comorbidities:   Mr. Leeanna Jimenez  has a past medical history of Asthma (2007), BPH (benign prostatic hypertrophy), Cancer ([facility] Chronic kidney disease (2004), COPD (chronic obstructive pulmonary disease) [facility] (7/28/2011), GERD (gastroesophageal reflux disease), Gout, Hemorrhoids, internal, History of diverticulosis, Hypercholesteremia, Hypertension, Muscle spasm, OA (osteoarthritis), REZA (obstructive sleep apnea) (7/28/2011), Osteoporosis, Personal history of colonic polyps, Prediabetes, and Wears dentures. Mr. Leeanna Jimenez  has a past surgical history that includes biopsy prostate (2008); pr sinus surgery proc unlisted (2005); hx prostatectomy (2002); hx gastrectomy (2004); and hx knee replacement (Bilateral). Social History/Living Environment:   Home Environment: Trailer/mobile home  # Steps to Enter: 8  Rails to Enter: Yes  [employer] : Bilateral  One/Two Story Residence: One story  Living Alone: Yes  Support Systems: Family member(s)  Patient Expects to be Discharged to[de-identified] Trailer/mobile home  Current DME Used/Available at Home: None  Prior Level of Function/Work/Activity: This kind patient had been independent with all prior functional mobility. Dominant Side:         RIGHT  Personal Factors:          Sex:  male        Age:  80 y.o.    Number of Personal Factors/Comorbidities that affect the Plan of Care: 0: LOW COMPLEXITY   EXAMINATION:   Most Recent Physical Functioning:   Gross Assessment:                  Posture: Balance:  Sitting: Intact  Standing: Intact Bed Mobility:  Rolling: Independent  Supine to Sit: Independent  Sit to Supine: Independent  Scooting: Independent  Wheelchair Mobility:     Transfers:  Sit to Stand: Independent  Stand to Sit: Independent  Bed to Chair: Independent  Gait:     Step Length: Left shortened;Right shortened  Distance (ft): 100 Feet (ft)  Assistive Device: (none)      Body Structures Involved:  1. Bones  2. Joints  3. Muscles  4. Ligaments Body Functions Affected:  1. Neuromusculoskeletal  2. Movement Related  3. Skin Related  4. Metobolic/Endocrine Activities and Participation Affected:  1. General Tasks and Demands  2. Communication  3. Mobility  4. Self Care  5. Domestic Life  6. Community, Social and [place]   Number of elements that affect the Plan of Care: 1-2: LOW COMPLEXITY   CLINICAL PRESENTATION:   Presentation: Stable and uncomplicated: LOW COMPLEXITY   CLINICAL DECISION MAKIN [facility] Inpatient Short Form  How much difficulty does the patient currently have. .. Unable A Lot A Little None   1. Turning over in bed (including adjusting bedclothes, sheets and blankets)? [] 1   [] 2   [] 3   [x] 4   2. Sitting down on and standing up from a chair with arms ( e.g., wheelchair, bedside commode, etc.)   [] 1   [] 2   [] 3   [x] 4   3. Moving from lying on back to sitting on the side of the bed? [] 1   [] 2   [] 3   [x] 4   How much help from another person does the patient currently need. .. Total A Lot A Little None   4. Moving to and from a bed to a chair (including a wheelchair)? [] 1   [] 2   [] 3   [x] 4   5. Need to walk in hospital room? [] 1   [] 2   [] 3   [x] 4   6. Climbing 3-5 steps with a railing? [] 1   [] 2   [x] 3   [] 4   © , Trustees of [facility] [employer], under license to [employer].  All rights reserved      Score:  Initial: 23 Most Recent: X (Date: -- )    Interpretation of Tool:  Represents activities that are increasingly more difficult (i.e. Bed mobility, Transfers, Gait). Medical Necessity:     · Patient demonstrates good rehab potential due to higher previous functional level. Reason for Services/Other Comments:  · Patient continues to require skilled intervention due to mild debility and decreased mobility. \.   Use of outcome tool(s) and clinical judgement create a POC that gives a: Clear prediction of patient's progress: LOW COMPLEXITY        TREATMENT:   (In addition to Assessment/Re-Assessment sessions the following treatments were rendered)   Pre-treatment Symptoms/Complaints:  0/10  Pain: Initial:   Pain Intensity 1: 0  Post Session:  0/10      Therapeutic Activity: (    10 Minutes): Therapeutic activities including Bed transfers, Chair transfers, Ambulation on level ground and standing balance activities and strengthening exercises to improve mobility, strength, balance and coordination. Required no   to promote static and dynamic balance in standing and promote coordination of bilateral, lower extremity(s). Date:  6/17/20 Date:   Date:     Activity/Exercise Parameters Parameters Parameters   Seated AP 1 x 15 BLE     Seated LAQ 1 x 15 BLE     Standing Marches 1 x 15 BLE     Sit to Stands 1 x 10 reps     Toe to Toe Walking 2 x 20 ft.     [email or web address] Dynamic Balance Activity 2 x 20 ft. Braces/Orthotics/Lines/Etc:   · O2 Device: Room air  Treatment/Session Assessment:    · Response to Treatment: Pt independent in all mobility; demonstrated independence and good safety during exercises. · Interdisciplinary Collaboration:   o Physical Therapist  · After treatment position/precautions:   o Up in chair  o Bed/Chair-wheels locked  o Bed in low position  o Call light within reach  o RN notified   · Compliance with Program/Exercises: Compliant all of the time  · Recommendations/Intent for next treatment session: Goals met. DARLIN PT.     Total Treatment Duration:  PT Patient Time In/Time Out  Time In: 1000  Time Out: [address] [place] [address], [place]

## 2020-06-17 NOTE — PROGRESS NOTES
Patient called RN to room requesting that his BP be checked. Patient complaining of a headache and is concerned that it is related to his BP. This RN checked patient's BP-- 176/86. Notified Dr. Gracia Young at 2129 via [employer] of all of the above stated. Dr. Gracia Young also informed that patient's home norvasc was resumed today but his metoprolol is still being held due to bradycardia. Patient's HR currently 54. Dr. Gracia Young advised that she would place new orders for hydralazine shortly. Will monitor for new orders and will follow any new orders.

## 2020-06-17 NOTE — PROGRESS NOTES
Beside shift report received from [facility]  Patient lying in bed  Respirations present  No signs of distress  No needs expressed at this time  Safety measures in place

## 2020-06-17 NOTE — PROGRESS NOTES
06/17/20 1801   Dual Skin Pressure Injury Assessment   Dual Skin Pressure Injury Assessment WDL   Second Care Provider (Based on [address] [place] [address]) Cristi Gallegos RN   Skin Integumentary   Skin Integumentary (WDL) WDL

## 2020-06-17 NOTE — PROGRESS NOTES
Hospitalist Progress Note    Admission Date: 2020  3:06 PM  Reason for Admission/Hospital Course: Sepsis ([facility] [A41.9]      24 Hour Events:  Patient seen and examined at bedside this morning. Patient is feeling well today and would like to go home. Denies any fevers, chills, shortness of breath, chest pain, nausea, vomiting. I explained to patient that we will need to wait 1 more day for his repeat blood cultures to come back. Patient agrees with plan. ROS:  10 point review of systems is otherwise negative with the exception of the elements mentioned above. No Known Allergies    OBJECTIVE:  Patient Vitals for the past 8 hrs:   BP Temp Pulse Resp SpO2   20 1054 125/84 98.4 °F (36.9 °C) 68 18 96 %   20 0747 141/78 98.6 °F (37 °C) 60 20 97 %     Temp (24hrs), Av.4 °F (36.9 °C), Min:97.7 °F (36.5 °C), Max:98.8 °F (37.1 °C)     07 -  1900  In: 240 [P.O.:240]  Out: -     PHYSICAL EXAM:  General: Well-appearing black male, no acute distress. Resting comfortably in bed       HEENT: Normocephalic, atraumatic. Oral mucosa is moist.  PERRLA   CV:  RRR, no murmurs   Lungs: Clear to auscultation bilaterally   Abdomen:   Soft, nontender, nondistended, normal bowel sounds  MSK: No gross skeletal deformities   Skin: Clean, dry, intact. No rashes or jaundice     Neuro: CN II through XII grossly intact    Psych: Appropriate mood and affect. Pleasant.         Labs:      Recent Labs     20  0357 20  0417 06/15/20  0730   WBC 4.4 5.6 12.4*   RBC 4.28 3.97* 3.87*   HGB 11.8* 10.8* 10.8*   HCT 34.6* 33.1* 31.6*   MCV 80.8 83.4 81.7   MCH 27.6 27.2 27.9   MCHC 34.1 32.6 34.2   RDW 14.7* 15.0* 15.1*    176 183   GRANS 53 54 71   LYMPH 21 20 14   MONOS 15* 13* 10   EOS 11* 12* 5   BASOS 0 0 0   IG 0 0 0   DF AUTOMATED AUTOMATED AUTOMATED   ANEU 2.3 3.1 8.9*   ABL 0.9 1.2 1.7   ABM 0.7 0.7 1.2   BRISEYDA 0.5 0.7 0.6   ABB 0.0 0.0 0.0   AIG 0.0 0.0 0.1        Recent Labs 06/17/20  0357 06/16/20  0417 06/15/20  0435 06/14/20  1530    143 140  --    K 4.2 3.8 4.4  --    * 113* 110*  --    CO2 25 23 22  --    AGAP 7 7 8  --    GLU 99 97 92  --    BUN 19 25* 26*  --    CREA 2.06* 2.04* 2.17*  --    GFRAA 40* 40* 37*  --    GFRNA 33* 33* 31*  --    CA 8.7 8.1* 7.7*  --     116 125  --    TP 7.0 6.2* 6.5  --    ALB 2.6* 2.4* 2.5*  --    GLOB 4.4* 3.8* 4.0*  --    AGRAT 0.6* 0.6* 0.6*  --    MG  --   --   --  2.0       Recent Results (from the past 24 hour(s))   CBC WITH AUTOMATED DIFF    Collection Time: 06/17/20  3:57 AM   Result Value Ref Range    WBC 4.4 4.3 - 11.1 K/uL    RBC 4.28 4.23 - 5.6 M/uL    HGB 11.8 (L) 13.6 - 17.2 g/dL    HCT 34.6 (L) 41.1 - 50.3 %    MCV 80.8 79.6 - 97.8 FL    MCH 27.6 26.1 - 32.9 PG    MCHC 34.1 31.4 - 35.0 g/dL    RDW 14.7 (H) 11.9 - 14.6 %    PLATELET 581 277 - 617 K/uL    MPV 11.0 9.4 - 12.3 FL    ABSOLUTE NRBC 0.00 0.0 - 0.2 K/uL    DF AUTOMATED      NEUTROPHILS 53 43 - 78 %    LYMPHOCYTES 21 13 - 44 %    MONOCYTES 15 (H) 4.0 - 12.0 %    EOSINOPHILS 11 (H) 0.5 - 7.8 %    BASOPHILS 0 0.0 - 2.0 %    IMMATURE GRANULOCYTES 0 0.0 - 5.0 %    ABS. NEUTROPHILS 2.3 1.7 - 8.2 K/UL    ABS. LYMPHOCYTES 0.9 0.5 - 4.6 K/UL    ABS. MONOCYTES 0.7 0.1 - 1.3 K/UL    ABS. EOSINOPHILS 0.5 0.0 - 0.8 K/UL    ABS. BASOPHILS 0.0 0.0 - 0.2 K/UL    ABS. IMM. GRANS. 0.0 0.0 - 0.5 K/UL   METABOLIC PANEL, COMPREHENSIVE    Collection Time: 06/17/20  3:57 AM   Result Value Ref Range    Sodium 141 136 - 145 mmol/L    Potassium 4.2 3.5 - 5.1 mmol/L    Chloride 109 (H) 98 - 107 mmol/L    CO2 25 21 - 32 mmol/L    Anion gap 7 7 - 16 mmol/L    Glucose 99 65 - 100 mg/dL    BUN 19 8 - 23 MG/DL    Creatinine 2.06 (H) 0.8 - 1.5 MG/DL    GFR est AA 40 (L) >60 ml/min/1.73m2    GFR est non-AA 33 (L) >60 ml/min/1.73m2    Calcium 8.7 8.3 - 10.4 MG/DL    Bilirubin, total 0.3 0.2 - 1.1 MG/DL    ALT (SGPT) 19 12 - 65 U/L    AST (SGOT) 22 15 - 37 U/L    Alk.  phosphatase 112 50 - 136 U/L    Protein, total 7.0 6.3 - 8.2 g/dL    Albumin 2.6 (L) 3.2 - 4.6 g/dL    Globulin 4.4 (H) 2.3 - 3.5 g/dL    A-G Ratio 0.6 (L) 1.2 - 3.5         Current Facility-Administered Medications   Medication Dose Route Frequency    amLODIPine (NORVASC) tablet 10 mg  10 mg Oral DAILY    hydrALAZINE (APRESOLINE) tablet 50 mg  50 mg Oral Q6H PRN    loratadine (CLARITIN) tablet 10 mg  10 mg Oral DAILY    calcitRIOL (ROCALTROL) capsule 0.5 mcg  0.5 mcg Oral DAILY    sodium bicarbonate tablet 650 mg  650 mg Oral TID    traZODone (DESYREL) tablet 50 mg  50 mg Oral QHS    heparin (porcine) injection 5,000 Units  5,000 Units SubCUTAneous Q8H    acetaminophen (TYLENOL) tablet 650 mg  650 mg Oral Q6H PRN    Or    acetaminophen (TYLENOL) suppository 650 mg  650 mg Rectal Q6H PRN    sodium chloride (NS) flush 5-40 mL  5-40 mL IntraVENous Q8H    sodium chloride (NS) flush 5-40 mL  5-40 mL IntraVENous PRN    cefTRIAXone (ROCEPHIN) 2 g in 0.9% sodium chloride (MBP/ADV) 50 mL  2 g IntraVENous Q24H    albuterol (PROVENTIL VENTOLIN) nebulizer solution 2.5 mg  2.5 mg Nebulization Q4H PRN          Imaging:    Ct Abd Pelv Wo Cont    Result Date: 6/15/2020  CT ABDOMEN AND PELVIS WITHOUT INTRAVENOUS CONTRAST. HISTORY: Vomiting and E. coli bacteremia. COMPARISON: August 2010 TECHNIQUE: 5 mm axial scans from above the diaphragms to the pubic symphysis following oral contrast only. Radiation dose reduction techniques were used for this study. Our CT scanners use one or more of the following:  Automated exposure control, adjustment of the mA and or kV according to patient size, iterative reconstruction. FINDINGS: -Lung Bases: The lung bases trace pleural fluid. -Liver: Large amount of pneumobilia. Gallbladder is absent. -Bile Ducts: Prominent. -Pancreas: Fatty atrophy. -Spleen: Uniform and normal size. -Stomach: Unremarkable. -Bowel: Normal caliber. No inflammatory changes. Extensive diverticulosis, especially the left colon. -Kidneys/Ureters: Normal size. No hydronephrosis. Several simple and mildly complex cysts. -Urinary Bladder: Unremarkable. -Adrenals: Are normal size. -Reproductive Organs: Unremarkable. -Lymph Nodes: No grossly enlarged retroperitoneal, mesenteric, or pelvic adenopathy. -Vasculature: Aorta is normal caliber, tortuous and mildly calcified. . -Bones: Mild scoliosis. . -Other: No ascites. IMPRESSION:  Prominent pneumobilia, status post cholecystectomy which can be normal. Extensive diverticulosis without acute inflammation. Xr Chest Port    Result Date: 6/14/2020  History: Fever, productive cough Exam: portable chest Comparison: 2/28/2019 Findings: No new alveolar infiltrate or pleural effusion. No change in the appearance of the mediastinal contour or osseous structures. Impressions: Stable portable chest          ASSESSMENT:    Problem List  Date Reviewed: 7/31/2018          Codes Class Noted    * (Principal) Sepsis ([facility] ICD-10-CM: A41.9  ICD-9-CM: 038.9, 995.91  6/14/2020        Suspected COVID-19 virus infection ICD-10-CM: Z20.828  ICD-9-CM: V01.79  6/14/2020        Acute hypoxemic respiratory failure (HCC) ICD-10-CM: J96.01  ICD-9-CM: 518.81  6/14/2020        Lactic acidosis ICD-10-CM: E87.2  ICD-9-CM: 276.2  6/14/2020        CKD (chronic kidney disease) stage 3, GFR 30-59 ml/min (HCC) ICD-10-CM: N18.3  ICD-9-CM: 585.3  8/23/2019        Gastroesophageal reflux disease without esophagitis ICD-10-CM: K21.9  ICD-9-CM: 530.81  4/20/2018        Prediabetes ICD-10-CM: R73.03  ICD-9-CM: 790.29  5/25/2016        Idiopathic chronic gout of multiple sites without tophus ICD-10-CM: M1A. 95D9  ICD-9-CM: 274.02  5/25/2016        Gastric carcinoma ([facility] ICD-10-CM: C16.9  ICD-9-CM: 151.9  5/19/2016        Hypertension ICD-10-CM: I10  ICD-9-CM: 401.9  Unknown    Overview Signed 7/22/2015  9:08 AM by Deisy escobar w/med. Last echo (9/14/10):EF 55-60%, normal LVSF.  Stress test (9/14/10): EF 56%,mild inferoseptal scar. Osteoporosis ICD-10-CM: M81.0  ICD-9-CM: 733.00  Unknown        Status post total knee replacement ICD-10-CM: G36.912  ICD-9-CM: V43.65  10/14/2011        Osteoarthritis of knee (Chronic) ICD-10-CM: M17.10  ICD-9-CM: 715.36  7/28/2011        Knee arthroplasty ICD-9-CM: V43.65  7/28/2011        Hypertensive CKD (chronic kidney disease) ICD-10-CM: I12.9  ICD-9-CM: 403.90  7/28/2011        REZA (obstructive sleep apnea) ICD-10-CM: G47.33  ICD-9-CM: 327.23  7/28/2011        COPD (chronic obstructive pulmonary disease) ([facility] ICD-10-CM: J44.9  ICD-9-CM: 496  7/28/2011        Prostate cancer ([facility] ICD-10-CM: C61  ICD-9-CM: 185  7/28/2011    Overview Signed 7/28/2011 10:05 PM by Tere Russell NP     Currently on eligard                       PLAN:    Sepsis secondary to E. Coli bacteremia -improving  Currently remains afebrile overnight. White count has defervesced. We will continue with antibiotic treatment for now. Repeat blood cultures show no growth at 24 hours. Final cultures and sensitivities still pending.  - Follow-up repeat blood cultures  - Continue with ceftriaxone   - Hold off on echocardiogram for now    COVID-19 ruled out  COVID testing was negative.  DC isolation precautions and sent to medical floor.     CKD stage III  Creatinine at baseline. Continue to monitor for now     COPD/asthma  Patient currently not in any respiratory distress. Resting comfortably on room air.  Albuterol as needed.       Sinus Bradycardia:   Heart rate still noted to be low overnight. However patient remains asymptomatic. TSH within normal limits  Continue holding metoprolol for now    Hypertension  Currently normotensive after restarting amlodipine.   We will continue with current regimen    Fluids: Not indicated  Electrolytes: Replete as needed  Nutrition: Cardiac diet  CODE STATUS: DNR  DVT prophylaxis: Heparin subcu     Anticipate discharge within 24 hours if repeat blood cultures continue to be negative. Still pending final sensitivities at this time.   Patient can be transferred off [facility] floor to any available medical bed.

## 2020-06-17 NOTE — PROGRESS NOTES
Asked patient if he wanted me to contact anyone about his room change.   Patient stated that he did not want me to call anyone

## 2020-06-18 VITALS
HEIGHT: 68 IN | BODY MASS INDEX: 25.76 KG/M2 | HEART RATE: 62 BPM | WEIGHT: 170 LBS | DIASTOLIC BLOOD PRESSURE: 64 MMHG | RESPIRATION RATE: 20 BRPM | TEMPERATURE: 98.3 F | OXYGEN SATURATION: 97 % | SYSTOLIC BLOOD PRESSURE: 129 MMHG

## 2020-06-18 LAB
ANION GAP SERPL CALC-SCNC: 6 MMOL/L (ref 7–16)
BACTERIA SPEC CULT: ABNORMAL
BUN SERPL-MCNC: 21 MG/DL (ref 8–23)
CALCIUM SERPL-MCNC: 8.8 MG/DL (ref 8.3–10.4)
CHLORIDE SERPL-SCNC: 107 MMOL/L (ref 98–107)
CO2 SERPL-SCNC: 26 MMOL/L (ref 21–32)
CREAT SERPL-MCNC: 2.19 MG/DL (ref 0.8–1.5)
ERYTHROCYTE [DISTWIDTH] IN BLOOD BY AUTOMATED COUNT: 14.9 % (ref 11.9–14.6)
GLUCOSE SERPL-MCNC: 78 MG/DL (ref 65–100)
GRAM STN SPEC: ABNORMAL
HCT VFR BLD AUTO: 38.5 % (ref 41.1–50.3)
HGB BLD-MCNC: 12.8 G/DL (ref 13.6–17.2)
MCH RBC QN AUTO: 27.5 PG (ref 26.1–32.9)
MCHC RBC AUTO-ENTMCNC: 33.2 G/DL (ref 31.4–35)
MCV RBC AUTO: 82.8 FL (ref 79.6–97.8)
NRBC # BLD: 0 K/UL (ref 0–0.2)
PLATELET # BLD AUTO: 218 K/UL (ref 150–450)
PMV BLD AUTO: 11 FL (ref 9.4–12.3)
POTASSIUM SERPL-SCNC: 4.5 MMOL/L (ref 3.5–5.1)
RBC # BLD AUTO: 4.65 M/UL (ref 4.23–5.6)
SERVICE CMNT-IMP: ABNORMAL
SERVICE CMNT-IMP: ABNORMAL
SODIUM SERPL-SCNC: 139 MMOL/L (ref 136–145)
WBC # BLD AUTO: 4.5 K/UL (ref 4.3–11.1)

## 2020-06-18 PROCEDURE — 36415 COLL VENOUS BLD VENIPUNCTURE: CPT

## 2020-06-18 PROCEDURE — 74011250637 HC RX REV CODE- 250/637: Performed by: INTERNAL MEDICINE

## 2020-06-18 PROCEDURE — 74011250637 HC RX REV CODE- 250/637: Performed by: HOSPITALIST

## 2020-06-18 PROCEDURE — 85027 COMPLETE CBC AUTOMATED: CPT

## 2020-06-18 PROCEDURE — 80048 BASIC METABOLIC PNL TOTAL CA: CPT

## 2020-06-18 RX ORDER — SULFAMETHOXAZOLE AND TRIMETHOPRIM 800; 160 MG/1; MG/1
1 TABLET ORAL EVERY 12 HOURS
Qty: 20 TAB | Refills: 0 | Status: SHIPPED | OUTPATIENT
Start: 2020-06-18 | End: 2020-06-28

## 2020-06-18 RX ORDER — SULFAMETHOXAZOLE AND TRIMETHOPRIM 800; 160 MG/1; MG/1
1 TABLET ORAL EVERY 8 HOURS
Status: DISCONTINUED | OUTPATIENT
Start: 2020-06-18 | End: 2020-06-18 | Stop reason: HOSPADM

## 2020-06-18 RX ADMIN — Medication 10 ML: at 06:06

## 2020-06-18 RX ADMIN — METOPROLOL SUCCINATE 50 MG: 50 TABLET, EXTENDED RELEASE ORAL at 08:25

## 2020-06-18 RX ADMIN — CALCITRIOL 0.5 MCG: 0.25 CAPSULE ORAL at 08:25

## 2020-06-18 RX ADMIN — SULFAMETHOXAZOLE AND TRIMETHOPRIM 1 TABLET: 800; 160 TABLET ORAL at 08:44

## 2020-06-18 RX ADMIN — AMLODIPINE BESYLATE 10 MG: 10 TABLET ORAL at 08:25

## 2020-06-18 RX ADMIN — LORATADINE 10 MG: 10 TABLET ORAL at 08:25

## 2020-06-18 RX ADMIN — SODIUM BICARBONATE 650 MG TABLET 650 MG: at 08:25

## 2020-06-18 NOTE — PROGRESS NOTES
Problem: Falls - Risk of  Goal: *Absence of Falls  Description: Document Earma Tanvir Fall Risk and appropriate interventions in the flowsheet.   Outcome: Progressing Towards Goal  Note: Fall Risk Interventions:            Medication Interventions: Evaluate medications/consider consulting pharmacy                   Problem: Sepsis: Day 2  Goal: *Hemodynamically stable  Outcome: Progressing Towards Goal  Goal: *Tolerating diet  Outcome: Progressing Towards Goal  Goal: Activity/Safety  Outcome: Progressing Towards Goal     Problem: Pain  Goal: *Control of Pain  Outcome: Progressing Towards Goal

## 2020-06-18 NOTE — PROGRESS NOTES
Pt medically ready for dc home with no dc needs. Care Management Interventions  PCP Verified by CM: Yes  Mode of Transport at Discharge:  Other (see comment)  Transition of Care Consult (CM Consult): Discharge Planning  Discharge Durable Medical Equipment: No  Physical Therapy Consult: Yes  Occupational Therapy Consult: Yes  Speech Therapy Consult: No  Current Support Network: Lives Alone, Own Home  Confirm Follow Up Transport: Self  The Patient and/or Patient Representative was Provided with a Choice of Provider and Agrees with the Discharge Plan?: Yes  Discharge Location  Discharge Placement: Home

## 2020-06-18 NOTE — PROGRESS NOTES
Pt with no new complaints overnight; he is anxious to go home today. PIV removed. Script given for antibiotic. To follow up with primary care.

## 2020-06-18 NOTE — DISCHARGE SUMMARY
Discharge Summary     Patient: Brandee Castillo MRN: [id number]  SSN: [id number]    YOB: 1933  Age: 80 y.o. Sex: male       Admit Date: 6/14/2020    Discharge Date: 6/18/2020      Admission Diagnoses: Sepsis [facility]) [A41.9]    Discharge Diagnoses:   Problem List as of 6/18/2020 Date Reviewed: 7/31/2018          Codes Class Noted - Resolved    * (Principal) Sepsis ([facility] ICD-10-CM: A41.9  ICD-9-CM: 038.9, 995.91  6/14/2020 - Present        Suspected COVID-19 virus infection ICD-10-CM: Z20.828  ICD-9-CM: V01.79  6/14/2020 - Present        Acute hypoxemic respiratory failure ([facility] ICD-10-CM: J96.01  ICD-9-CM: 518.81  6/14/2020 - Present        Lactic acidosis ICD-10-CM: E87.2  ICD-9-CM: 276.2  6/14/2020 - Present        CKD (chronic kidney disease) stage 3, GFR 30-59 ml/min ([facility]) ICD-10-CM: N18.3  ICD-9-CM: 585.3  8/23/2019 - Present        Gastroesophageal reflux disease without esophagitis ICD-10-CM: K21.9  ICD-9-CM: 530.81  4/20/2018 - Present        Prediabetes ICD-10-CM: R73.03  ICD-9-CM: 790.29  5/25/2016 - Present        Idiopathic chronic gout of multiple sites without tophus ICD-10-CM: M1A. 06V5  ICD-9-CM: 274.02  5/25/2016 - Present        Gastric carcinoma ([facility] ICD-10-CM: C16.9  ICD-9-CM: 151.9  5/19/2016 - Present        Hypertension ICD-10-CM: I10  ICD-9-CM: 401.9  Unknown - Present    Overview Signed 7/22/2015  9:08 AM by Lyle escobar w/med. Last echo (9/14/10):EF 55-60%, normal LVSF. Stress test (9/14/10): EF 56%,mild inferoseptal scar.              Osteoporosis ICD-10-CM: M81.0  ICD-9-CM: 733.00  Unknown - Present        Status post total knee replacement ICD-10-CM: U89.842  ICD-9-CM: V43.65  10/14/2011 - Present        Osteoarthritis of knee (Chronic) ICD-10-CM: M17.10  ICD-9-CM: 715.36  7/28/2011 - Present        Knee arthroplasty ICD-9-CM: V43.65  7/28/2011 - Present        Hypertensive CKD (chronic kidney disease) ICD-10-CM: I12.9  ICD-9-CM: 403.90  7/28/2011 - Present        REZA (obstructive sleep apnea) ICD-10-CM: G47.33  ICD-9-CM: 327.23  7/28/2011 - Present        COPD (chronic obstructive pulmonary disease) (HCC) ICD-10-CM: J44.9  ICD-9-CM: 496  7/28/2011 - Present        Prostate cancer ([address] ICD-10-CM: C61  ICD-9-CM: 185  7/28/2011 - Present    Overview Signed 7/28/2011 10:05 PM by Juliet Ramos NP     Currently on eligard               RESOLVED: Essential hypertension with goal blood pressure less than 140/90 ICD-10-CM: I10  ICD-9-CM: 401.9  5/25/2016 - 2/20/2018               Discharge Condition: Good    Physical Exam:  General:    Well-appearing black male, no acute distress. Sitting comfortably in chair. HEENT: Normocephalic, atraumatic, PERRLA  CV:   RRR, no murmurs  Lungs:   Clear to auscultation bilaterally  Abdomen:   Soft, nontender, nondistended, normal bowel sounds  Extremities: No skeletal deformities  Skin:     No jaundice or rash, clean dry intact  Neuro:  CN II through XII grossly intact  Psych:  Alert and oriented x4, pleasant, cooperative. Hospital Course:     Patient is 70-year-old male with past medical history significant for hypertension, Asthma, gastric cancer status post resection, chronic kidney disease stage III, GERD presented to the ER because of fever weakness and shortness of breath. Patient stated that for the last 2 days he has been having fever and chills. Unsure of his temperature , but it was 103 -104 F. He has cough productive with white-colored phlegm. Shortness of breath at rest worse with activity. No orthopnea no paroxysmal nocturnal dyspnea. No chest pain no palpitations. No dysuria urgency or frequency of urination. He reported nonbilious, nonbloody vomiting and loose stools. No abdominal pain. He also noticed generalized weakness with muscle aches. No tingling numbness or focal weakness. Patient was found to be febrile in the ER as well as slightly hypoxic.   He had elevated procalcitonin and lactic acid and there was concern for possible coinfection. Patient was tested and placed on the COVID unit. COVID test came back negative and blood cultures grew gram-negative rods which turned out to be E. coli. Patient was on ceftriaxone and white count defervesced and patient was afebrile for the past 72 hours. Repeat blood cultures are now negative. Sensitivity panel returned and E. coli is sensitive to Bactrim. Patient to be discharged on Bactrim therapy for an additional 10 days to complete 14 days of treatment. Patient is agreeable to plan. He is currently medically stable for discharge    Consults: None    Significant Diagnostic Studies: Ct Abd Pelv Wo Cont    Result Date: 6/15/2020  CT ABDOMEN AND PELVIS WITHOUT INTRAVENOUS CONTRAST. HISTORY: Vomiting and E. coli bacteremia. COMPARISON: August 2010 TECHNIQUE: 5 mm axial scans from above the diaphragms to the pubic symphysis following oral contrast only. Radiation dose reduction techniques were used for this study. Our CT scanners use one or more of the following:  Automated exposure control, adjustment of the mA and or kV according to patient size, iterative reconstruction. FINDINGS: -Lung Bases: The lung bases trace pleural fluid. -Liver: Large amount of pneumobilia. Gallbladder is absent. -Bile Ducts: Prominent. -Pancreas: Fatty atrophy. -Spleen: Uniform and normal size. -Stomach: Unremarkable. -Bowel: Normal caliber. No inflammatory changes. Extensive diverticulosis, especially the left colon. -Kidneys/Ureters: Normal size. No hydronephrosis. Several simple and mildly complex cysts. -Urinary Bladder: Unremarkable. -Adrenals: Are normal size. -Reproductive Organs: Unremarkable. -Lymph Nodes: No grossly enlarged retroperitoneal, mesenteric, or pelvic adenopathy. -Vasculature: Aorta is normal caliber, tortuous and mildly calcified. . -Bones: Mild scoliosis. . -Other: No ascites.      IMPRESSION:  Prominent pneumobilia, status post cholecystectomy which can be normal. Extensive diverticulosis without acute inflammation. Xr Chest Port    Result Date: 2020  History: Fever, productive cough Exam: portable chest Comparison: 2019 Findings: No new alveolar infiltrate or pleural effusion. No change in the appearance of the mediastinal contour or osseous structures. Impressions: Stable portable chest        Disposition: home    Discharge Medications:   Current Discharge Medication List      START taking these medications    Details   trimethoprim-sulfamethoxazole (BACTRIM DS, SEPTRA DS) 160-800 mg per tablet Take 1 Tab by mouth every twelve (12) hours for 10 days. Qty: 20 Tab, Refills: 0         CONTINUE these medications which have NOT CHANGED    Details   fexofenadine (Allegra Allergy) 180 mg tablet Take 1 Tab by mouth daily. Qty: 30 Tab, Refills: 6      sodium bicarbonate 650 mg tablet TAKE 2 TABLETS BY MOUTH 3 TIMES A DAY      traZODone (DESYREL) 50 mg tablet Take 1-2 Tabs by mouth nightly. Qty: 60 Tab, Refills: 5      metoprolol succinate (TOPROL-XL) 50 mg XL tablet TAKE ONE TABLET BY MOUTH ONCE DAILY. Qty: 90 Tab, Refills: 2    Comments: NEEDS A NEW PRESCRIPTION. CURRENT ONES .      amLODIPine (NORVASC) 10 mg tablet TAKE 1 TAB BY MOUTH DAILY FOR HIGH BLOOD PRESSURE  Qty: 90 Tab, Refills: 3    Comments: NEEDS A NEW PRESCIPTION. CURRENT ONE . calcitRIOL (ROCALTROL) 0.5 mcg capsule       albuterol (ProAir HFA) 90 mcg/actuation inhaler Take 1 Puff by inhalation every four (4) hours as needed for Shortness of Breath for up to 90 days. Ventolin inhaler  Qty: 3 Inhaler, Refills: 3    Associated Diagnoses: Mild persistent asthma without complication      fluticasone furoate-vilanteroL (Breo Ellipta) 100-25 mcg/dose inhaler INHALE ONE PUFF BY MOUTH ONCE DAILY.   Qty: 1 Inhaler, Refills: 11             Activity: Activity as tolerated  Diet: Regular Diet  Wound Care: None needed    Follow-up Appointments   Procedures    FOLLOW UP VISIT Appointment in: One Week Please follow up with your PCP within 1 week for post discharge hospitalization follow up visit. Please follow up with your PCP within 1 week for post discharge hospitalization follow up visit. Standing Status:   Standing     Number of Occurrences:   1     Order Specific Question:   Appointment in     Answer:    One Week       Signed By: Franky Rae MD     June 18, 2020

## 2020-06-19 ENCOUNTER — PATIENT OUTREACH (OUTPATIENT)
Dept: CASE MANAGEMENT | Age: 85
End: 2020-06-19

## 2020-06-21 LAB
BACTERIA SPEC CULT: NORMAL
BACTERIA SPEC CULT: NORMAL
SERVICE CMNT-IMP: NORMAL
SERVICE CMNT-IMP: NORMAL

## 2020-06-24 NOTE — PROGRESS NOTES
Patient contacted regarding recent discharge and COVID-19 risk. Discussed COVID-19 related testing which was not done at this time. Test results were not done. Patient informed of results, if available? N/A    Care Transition Nurse/ Ambulatory Care Manager contacted the patient by telephone to perform post discharge assessment. Verified name and  with patient as identifiers. Patient has following risk factors of: chronic kidney disease. CTN/ACM reviewed discharge instructions, medical action plan and red flags related to discharge diagnosis. Reviewed and educated them on any new and changed medications related to discharge diagnosis. Advised obtaining a 90-day supply of all daily and as-needed medications. Education provided regarding infection prevention, and signs and symptoms of COVID-19 and when to seek medical attention with patient who verbalized understanding. Discussed exposure protocols and quarantine from [address] you at higher risk for severe illness  and given an opportunity for questions and concerns. The patient agrees to contact the COVID-19 hotline [phone] or PCP office for questions related to their healthcare. CTN/ACM provided contact information for future reference. From CDC: Are you at higher risk for severe illness?  Wash your hands often.  Avoid close contact (6 feet, which is about two arm lengths) with people who are sick.  Put distance between yourself and other people if COVID-19 is spreading in your community.  Clean and disinfect frequently touched surfaces.  Avoid all cruise travel and non-essential air travel.  Call your healthcare professional if you have concerns about COVID-19 and your underlying condition or if you are sick.     For more information on steps you can take to protect yourself, see CDC's How to Protect Yourself      Patient/family/caregiver given information for [facility]ila and agrees to enroll no  Patient's preferred e-mail:  N/A  Patient's preferred phone number: N/A  Based on Loop alert triggers, patient will be contacted by nurse care manager for worsening symptoms: declined enrollment    Plan for follow-up call in 7-14 days based on severity of symptoms and risk factors.

## 2020-07-03 ENCOUNTER — PATIENT OUTREACH (OUTPATIENT)
Dept: CASE MANAGEMENT | Age: 85
End: 2020-07-03

## 2020-07-03 NOTE — PROGRESS NOTES
Patient resolved from Transition of Care episode on 7/3/2020  Discussed COVID-19 related testing which was available at this time. Test results were negative. Patient informed of results, if available? N/A     Patient/family has been provided the following resources and education related to COVID-19:                         Signs, symptoms and red flags related to COVID-19            CDC exposure and quarantine guidelines            [facility] exposure contact - [phone]            Contact for their local Department of Health                 Patient currently reports that the following symptoms have improved:  no symptoms noted. No further outreach scheduled with this CTN. Episode of Care resolved. Patient has this CTN contact information if future needs arise.

## 2020-08-18 ENCOUNTER — HOSPICE ADMISSION (OUTPATIENT)
Dept: HOSPICE | Facility: HOSPICE | Age: 85
End: 2020-08-18
Payer: MEDICARE

## 2020-08-18 PROBLEM — I69.00: Status: ACTIVE | Noted: 2020-08-18

## 2020-08-18 PROBLEM — F06.32 DEPRESSIVE DISORDER DUE TO ANOTHER MEDICAL CONDITION WITH MAJOR DEPRESSIVE-LIKE EPISODE: Status: ACTIVE | Noted: 2020-01-16

## 2020-08-18 PROBLEM — F43.21 GRIEF: Status: ACTIVE | Noted: 2020-08-18

## 2020-08-18 PROBLEM — I60.9 SAH (SUBARACHNOID HEMORRHAGE) (HCC): Status: ACTIVE | Noted: 2020-08-17

## 2020-08-18 PROBLEM — I25.5 ISCHEMIC CARDIOMYOPATHY: Status: ACTIVE | Noted: 2020-08-18

## 2020-08-18 PROBLEM — R41.82 AMS (ALTERED MENTAL STATUS): Status: ACTIVE | Noted: 2020-08-18

## 2020-08-18 PROBLEM — R51.9 CEPHALGIA: Status: ACTIVE | Noted: 2020-08-18

## 2020-08-18 PROBLEM — I61.3 INTRACEREBRAL HEMORRHAGE OF BRAIN STEM (HCC): Status: ACTIVE | Noted: 2020-08-18

## 2020-08-18 PROBLEM — G91.9 HYDROCEPHALUS (HCC): Status: ACTIVE | Noted: 2020-08-18

## 2020-08-18 PROCEDURE — 3336500001 HSPC ELECTION

## 2021-07-30 LAB
CASTS URNS QL MICRO: 0 /LPF
CRYSTALS URNS QL MICRO: 0 /LPF
EPI CELLS #/AREA URNS HPF: NORMAL /HPF
MUCOUS THREADS URNS QL MICRO: 0 /LPF
RBC #/AREA URNS HPF: NORMAL /HPF
WBC URNS QL MICRO: NORMAL /HPF